# Patient Record
Sex: FEMALE | Race: BLACK OR AFRICAN AMERICAN | NOT HISPANIC OR LATINO | URBAN - METROPOLITAN AREA
[De-identification: names, ages, dates, MRNs, and addresses within clinical notes are randomized per-mention and may not be internally consistent; named-entity substitution may affect disease eponyms.]

---

## 2017-08-13 ENCOUNTER — EMERGENCY (EMERGENCY)
Facility: HOSPITAL | Age: 22
LOS: 1 days | Discharge: HOME | End: 2017-08-13
Attending: EMERGENCY MEDICINE | Admitting: EMERGENCY MEDICINE
Payer: MEDICAID

## 2017-08-13 VITALS
HEART RATE: 84 BPM | RESPIRATION RATE: 18 BRPM | DIASTOLIC BLOOD PRESSURE: 80 MMHG | OXYGEN SATURATION: 95 % | TEMPERATURE: 99 F | SYSTOLIC BLOOD PRESSURE: 114 MMHG

## 2017-08-13 VITALS
SYSTOLIC BLOOD PRESSURE: 107 MMHG | TEMPERATURE: 99 F | DIASTOLIC BLOOD PRESSURE: 75 MMHG | RESPIRATION RATE: 18 BRPM | HEART RATE: 81 BPM

## 2017-08-13 DIAGNOSIS — Z79.1 LONG TERM (CURRENT) USE OF NON-STEROIDAL ANTI-INFLAMMATORIES (NSAID): ICD-10-CM

## 2017-08-13 DIAGNOSIS — Z79.899 OTHER LONG TERM (CURRENT) DRUG THERAPY: ICD-10-CM

## 2017-08-13 DIAGNOSIS — R55 SYNCOPE AND COLLAPSE: ICD-10-CM

## 2017-08-13 DIAGNOSIS — R06.02 SHORTNESS OF BREATH: ICD-10-CM

## 2017-08-13 DIAGNOSIS — J20.9 ACUTE BRONCHITIS, UNSPECIFIED: ICD-10-CM

## 2017-08-13 LAB
ALBUMIN SERPL ELPH-MCNC: 4.1 G/DL — SIGNIFICANT CHANGE UP (ref 3.3–5)
ALP SERPL-CCNC: 88 U/L — SIGNIFICANT CHANGE UP (ref 40–120)
ALT FLD-CCNC: 11 U/L — SIGNIFICANT CHANGE UP (ref 10–45)
ANION GAP SERPL CALC-SCNC: 14 MMOL/L — SIGNIFICANT CHANGE UP (ref 5–17)
AST SERPL-CCNC: 15 U/L — SIGNIFICANT CHANGE UP (ref 10–40)
BASOPHILS NFR BLD AUTO: 0.2 % — SIGNIFICANT CHANGE UP (ref 0–2)
BILIRUB SERPL-MCNC: 0.4 MG/DL — SIGNIFICANT CHANGE UP (ref 0.2–1.2)
BUN SERPL-MCNC: 8 MG/DL — SIGNIFICANT CHANGE UP (ref 7–23)
CALCIUM SERPL-MCNC: 9.4 MG/DL — SIGNIFICANT CHANGE UP (ref 8.4–10.5)
CHLORIDE SERPL-SCNC: 104 MMOL/L — SIGNIFICANT CHANGE UP (ref 96–108)
CO2 SERPL-SCNC: 25 MMOL/L — SIGNIFICANT CHANGE UP (ref 22–31)
CREAT SERPL-MCNC: 0.8 MG/DL — SIGNIFICANT CHANGE UP (ref 0.5–1.3)
D DIMER BLD IA.RAPID-MCNC: 163 NG/ML DDU — SIGNIFICANT CHANGE UP
EOSINOPHIL NFR BLD AUTO: 2.8 % — SIGNIFICANT CHANGE UP (ref 0–6)
GLUCOSE SERPL-MCNC: 98 MG/DL — SIGNIFICANT CHANGE UP (ref 70–99)
HCT VFR BLD CALC: 40.2 % — SIGNIFICANT CHANGE UP (ref 34.5–45)
HGB BLD-MCNC: 13 G/DL — SIGNIFICANT CHANGE UP (ref 11.5–15.5)
LYMPHOCYTES # BLD AUTO: 39 % — SIGNIFICANT CHANGE UP (ref 13–44)
MCHC RBC-ENTMCNC: 27.7 PG — SIGNIFICANT CHANGE UP (ref 27–34)
MCHC RBC-ENTMCNC: 32.3 G/DL — SIGNIFICANT CHANGE UP (ref 32–36)
MCV RBC AUTO: 85.7 FL — SIGNIFICANT CHANGE UP (ref 80–100)
MONOCYTES NFR BLD AUTO: 6.1 % — SIGNIFICANT CHANGE UP (ref 2–14)
NEUTROPHILS NFR BLD AUTO: 51.9 % — SIGNIFICANT CHANGE UP (ref 43–77)
PLATELET # BLD AUTO: 283 K/UL — SIGNIFICANT CHANGE UP (ref 150–400)
POTASSIUM SERPL-MCNC: 4 MMOL/L — SIGNIFICANT CHANGE UP (ref 3.5–5.3)
POTASSIUM SERPL-SCNC: 4 MMOL/L — SIGNIFICANT CHANGE UP (ref 3.5–5.3)
PROT SERPL-MCNC: 7.6 G/DL — SIGNIFICANT CHANGE UP (ref 6–8.3)
RBC # BLD: 4.69 M/UL — SIGNIFICANT CHANGE UP (ref 3.8–5.2)
RBC # FLD: 12.8 % — SIGNIFICANT CHANGE UP (ref 10.3–16.9)
SODIUM SERPL-SCNC: 143 MMOL/L — SIGNIFICANT CHANGE UP (ref 135–145)
TROPONIN T SERPL-MCNC: <0.01 NG/ML — SIGNIFICANT CHANGE UP (ref 0–0.01)
WBC # BLD: 5.8 K/UL — SIGNIFICANT CHANGE UP (ref 3.8–10.5)
WBC # FLD AUTO: 5.8 K/UL — SIGNIFICANT CHANGE UP (ref 3.8–10.5)

## 2017-08-13 PROCEDURE — 80053 COMPREHEN METABOLIC PANEL: CPT

## 2017-08-13 PROCEDURE — 93010 ELECTROCARDIOGRAM REPORT: CPT

## 2017-08-13 PROCEDURE — 99285 EMERGENCY DEPT VISIT HI MDM: CPT | Mod: 25

## 2017-08-13 PROCEDURE — 71046 X-RAY EXAM CHEST 2 VIEWS: CPT

## 2017-08-13 PROCEDURE — 71010: CPT | Mod: 26

## 2017-08-13 PROCEDURE — 85025 COMPLETE CBC W/AUTO DIFF WBC: CPT

## 2017-08-13 PROCEDURE — 93005 ELECTROCARDIOGRAM TRACING: CPT

## 2017-08-13 PROCEDURE — 85379 FIBRIN DEGRADATION QUANT: CPT

## 2017-08-13 PROCEDURE — 84484 ASSAY OF TROPONIN QUANT: CPT

## 2017-08-13 PROCEDURE — 71020: CPT | Mod: 26

## 2017-08-13 PROCEDURE — 99283 EMERGENCY DEPT VISIT LOW MDM: CPT | Mod: 25

## 2017-08-13 RX ORDER — ALBUTEROL 90 UG/1
2 AEROSOL, METERED ORAL
Qty: 1 | Refills: 0
Start: 2017-08-13

## 2017-08-13 RX ORDER — IBUPROFEN 200 MG
1 TABLET ORAL
Qty: 12 | Refills: 0
Start: 2017-08-13

## 2017-08-13 NOTE — ED PROVIDER NOTE - OBJECTIVE STATEMENT
pt without signficant pmhx having nasal conges and nonprod cough x 2 wks.  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  she started having pain in sternal region when coughing or taking deep breath.  at one point this afternoon, she stood up quickly from chair and became lightheaded.  she sat down and still felt dizzy for a few moments before she apparently fainted, falling off side of chair.  coworker called [phone].  unknown duration of LOC.  she says she was not hurt when she fell.  she has never fainted before. there were no palpitations.  no known family hx of early heart disease or sudden death, but she doesn't know anything about her father's medical hx.  no hx of dvt/pe, estrogen use, recent immobilization, unilateral leg pain or swelling, hemoptysis, or malignancy.

## 2017-08-13 NOTE — ED PROVIDER NOTE - PHYSICAL EXAMINATION
CONSTITUTIONAL: WD,WN. NAD.    SKIN: Normal color and turgor. No rash.    HEAD: NC/AT.  EYES: Conjunctiva clear. EOMI. PERRL.    ENT: Airway patent, OP clear. Nasal mucosa clear, no rhinorrhea.   RESPIRATORY:  Breathing non-labored. No retractions or accessory muscle use.  Lungs CTA bilat.  CARDIOVASCULAR:  RRR, S1S2. No M/R/G.      GI:  Abdomen soft, nontender.    MSK: + tenderness in sternal region.  Neck supple with painless ROM.  No lower extremity edema or calf tenderness.  No joint swelling or ROM limitation.  NEURO: Alert and oriented; JAMIL with normal strength.  Normal speech and gait.

## 2017-08-13 NOTE — ED PROVIDER NOTE - ATTENDING CONTRIBUTION TO CARE
22 yo F p/w cough, dry, sob x 2 weeks.  No fever, chills.  Mild pleurisy.  No LE edema or calf ttp.  BL wheezing noted, nonlabored, comfortable.  VSS.  PERC neg.  Do not suspect PE.  Pt tx with nebs and improved.  Likely bronchitis vs RAD.  No indication for admission, no pna.  Plan outpt tx and fu.

## 2017-08-13 NOTE — ED PROVIDER NOTE - MEDICAL DECISION MAKING DETAILS
Pt with URI and cough for 2 weeks.  CP likely musculoskeletal in nature; PERC negative and low clinical susp for PE, but had syncopal episode today after a prodrome. Will check d-dimer.  Check EKG and CXR.

## 2017-08-13 NOTE — ED PROVIDER NOTE - NS ED ROS FT
CONSTITUTIONAL: No fever, chills, or weakness  NEURO: No headache, no dizziness; No weakness/tingling/numbness  EYES: No visual changes  ENT: No rhinorrhea or sore throat  PULM: per HPI  CV: No chest pain or palpitations  GI: No abdominal pain, vomiting, or diarrhea  : No dysuria, hematuria, frequency  MSK: No neck pain or back pain, no joint pain  SKIN: no rash

## 2018-02-15 ENCOUNTER — EMERGENCY (EMERGENCY)
Facility: HOSPITAL | Age: 23
LOS: 1 days | Discharge: HOME | End: 2018-02-15
Admitting: EMERGENCY MEDICINE
Payer: MEDICAID

## 2018-02-15 VITALS
SYSTOLIC BLOOD PRESSURE: 113 MMHG | OXYGEN SATURATION: 98 % | DIASTOLIC BLOOD PRESSURE: 72 MMHG | HEART RATE: 75 BPM | RESPIRATION RATE: 18 BRPM | TEMPERATURE: 98 F

## 2018-02-15 VITALS
HEART RATE: 80 BPM | DIASTOLIC BLOOD PRESSURE: 64 MMHG | OXYGEN SATURATION: 100 % | RESPIRATION RATE: 18 BRPM | SYSTOLIC BLOOD PRESSURE: 122 MMHG

## 2018-02-15 DIAGNOSIS — Z79.1 LONG TERM (CURRENT) USE OF NON-STEROIDAL ANTI-INFLAMMATORIES (NSAID): ICD-10-CM

## 2018-02-15 DIAGNOSIS — Z79.899 OTHER LONG TERM (CURRENT) DRUG THERAPY: ICD-10-CM
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
## 2018-02-15 DIAGNOSIS — R10.32 LEFT LOWER QUADRANT PAIN: ICD-10-CM

## 2018-02-15 DIAGNOSIS — R11.0 NAUSEA: ICD-10-CM
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
## 2018-02-15 DIAGNOSIS — S60.229A CONTUSION OF UNSPECIFIED HAND, INITIAL ENCOUNTER: ICD-10-CM

## 2018-02-15 LAB
ALBUMIN SERPL ELPH-MCNC: 3.8 G/DL — SIGNIFICANT CHANGE UP (ref 3.4–5)
ALP SERPL-CCNC: 77 U/L — SIGNIFICANT CHANGE UP (ref 40–120)
ALT FLD-CCNC: 20 U/L — SIGNIFICANT CHANGE UP (ref 12–42)
ANION GAP SERPL CALC-SCNC: 9 MMOL/L — SIGNIFICANT CHANGE UP (ref 9–16)
APPEARANCE UR: CLEAR — SIGNIFICANT CHANGE UP
AST SERPL-CCNC: 16 U/L — SIGNIFICANT CHANGE UP (ref 15–37)
BILIRUB SERPL-MCNC: 0.5 MG/DL — SIGNIFICANT CHANGE UP (ref 0.2–1.2)
BILIRUB UR-MCNC: (no result)
BUN SERPL-MCNC: 9 MG/DL — SIGNIFICANT CHANGE UP (ref 7–23)
CALCIUM SERPL-MCNC: 9.3 MG/DL — SIGNIFICANT CHANGE UP (ref 8.5–10.5)
CHLORIDE SERPL-SCNC: 105 MMOL/L — SIGNIFICANT CHANGE UP (ref 96–108)
CO2 SERPL-SCNC: 28 MMOL/L — SIGNIFICANT CHANGE UP (ref 22–31)
COLOR SPEC: YELLOW — SIGNIFICANT CHANGE UP
CREAT SERPL-MCNC: 0.78 MG/DL — SIGNIFICANT CHANGE UP (ref 0.5–1.3)
DIFF PNL FLD: NEGATIVE — SIGNIFICANT CHANGE UP
GLUCOSE SERPL-MCNC: 91 MG/DL — SIGNIFICANT CHANGE UP (ref 70–99)
GLUCOSE UR QL: NEGATIVE — SIGNIFICANT CHANGE UP
HCG SERPL-ACNC: <1 MIU/ML — SIGNIFICANT CHANGE UP
HCG UR QL: NEGATIVE — SIGNIFICANT CHANGE UP
HCT VFR BLD CALC: 37.8 % — SIGNIFICANT CHANGE UP (ref 34.5–45)
HGB BLD-MCNC: 12.1 G/DL — SIGNIFICANT CHANGE UP (ref 11.5–15.5)
KETONES UR-MCNC: (no result) MG/DL
LACTATE SERPL-SCNC: 0.7 MMOL/L — SIGNIFICANT CHANGE UP (ref 0.4–2)
LEUKOCYTE ESTERASE UR-ACNC: (no result)
LIDOCAIN IGE QN: 85 U/L — SIGNIFICANT CHANGE UP (ref 73–393)
MCHC RBC-ENTMCNC: 27.9 PG — SIGNIFICANT CHANGE UP (ref 27–34)
MCHC RBC-ENTMCNC: 32 G/DL — SIGNIFICANT CHANGE UP (ref 32–36)
MCV RBC AUTO: 87.1 FL — SIGNIFICANT CHANGE UP (ref 80–100)
NITRITE UR-MCNC: NEGATIVE — SIGNIFICANT CHANGE UP
PCP SPEC-MCNC: SIGNIFICANT CHANGE UP
PH UR: 7 — SIGNIFICANT CHANGE UP (ref 5–8)
PLATELET # BLD AUTO: 276 K/UL — SIGNIFICANT CHANGE UP (ref 150–400)
POTASSIUM SERPL-MCNC: 3.9 MMOL/L — SIGNIFICANT CHANGE UP (ref 3.5–5.3)
POTASSIUM SERPL-SCNC: 3.9 MMOL/L — SIGNIFICANT CHANGE UP (ref 3.5–5.3)
PROT SERPL-MCNC: 7.4 G/DL — SIGNIFICANT CHANGE UP (ref 6.4–8.2)
PROT UR-MCNC: NEGATIVE MG/DL — SIGNIFICANT CHANGE UP
RBC # BLD: 4.34 M/UL — SIGNIFICANT CHANGE UP (ref 3.8–5.2)
RBC # FLD: 12.1 % — SIGNIFICANT CHANGE UP (ref 10.3–16.9)
SODIUM SERPL-SCNC: 142 MMOL/L — SIGNIFICANT CHANGE UP (ref 132–145)
SP GR SPEC: 1.02 — SIGNIFICANT CHANGE UP (ref 1–1.03)
UROBILINOGEN FLD QL: 1 E.U./DL — SIGNIFICANT CHANGE UP
WBC # BLD: 4.7 K/UL — SIGNIFICANT CHANGE UP (ref 3.8–10.5)
WBC # FLD AUTO: 4.7 K/UL — SIGNIFICANT CHANGE UP (ref 3.8–10.5)

## 2018-02-15 PROCEDURE — 76830 TRANSVAGINAL US NON-OB: CPT | Mod: 26

## 2018-02-15 PROCEDURE — 99285 EMERGENCY DEPT VISIT HI MDM: CPT

## 2018-02-15 PROCEDURE — 73130 X-RAY EXAM OF HAND: CPT | Mod: 26,RT

## 2018-02-15 PROCEDURE — 74019 RADEX ABDOMEN 2 VIEWS: CPT | Mod: 26

## 2018-02-15 RX ORDER — ACETAMINOPHEN 500 MG
650 TABLET ORAL ONCE
Refills: 0 | Status: COMPLETED | OUTPATIENT
Start: 2018-02-15 | End: 2018-02-15

## 2018-02-15 RX ORDER — SODIUM CHLORIDE 9 MG/ML
3 INJECTION INTRAMUSCULAR; INTRAVENOUS; SUBCUTANEOUS ONCE
Refills: 0 | Status: COMPLETED | OUTPATIENT
Start: 2018-02-15 | End: 2018-02-15

## 2018-02-15 RX ORDER — PHENAZOPYRIDINE HCL 100 MG
200 TABLET ORAL ONCE
Refills: 0 | Status: COMPLETED | OUTPATIENT
Start: 2018-02-15 | End: 2018-02-15

## 2018-02-15 RX ORDER — AZITHROMYCIN 500 MG/1
1000 TABLET, FILM COATED ORAL ONCE
Refills: 0 | Status: COMPLETED | OUTPATIENT
Start: 2018-02-15 | End: 2018-02-15

## 2018-02-15 RX ORDER — IBUPROFEN 200 MG
600 TABLET ORAL ONCE
Refills: 0 | Status: COMPLETED | OUTPATIENT
Start: 2018-02-15 | End: 2018-02-15

## 2018-02-15 RX ORDER — CEFTRIAXONE 500 MG/1
250 INJECTION, POWDER, FOR SOLUTION INTRAMUSCULAR; INTRAVENOUS ONCE
Refills: 0 | Status: COMPLETED | OUTPATIENT
Start: 2018-02-15 | End: 2018-02-15

## 2018-02-15 RX ADMIN — Medication 600 MILLIGRAM(S): at 23:14

## 2018-02-15 RX ADMIN — SODIUM CHLORIDE 3 MILLILITER(S): 9 INJECTION INTRAMUSCULAR; INTRAVENOUS; SUBCUTANEOUS at 19:48

## 2018-02-15 RX ADMIN — Medication 650 MILLIGRAM(S): at 21:22

## 2018-02-15 RX ADMIN — AZITHROMYCIN 1000 MILLIGRAM(S): 500 TABLET, FILM COATED ORAL at 23:13

## 2018-02-15 RX ADMIN — CEFTRIAXONE 250 MILLIGRAM(S): 500 INJECTION, POWDER, FOR SOLUTION INTRAMUSCULAR; INTRAVENOUS at 23:13

## 2018-02-15 RX ADMIN — Medication 200 MILLIGRAM(S): at 21:22

## 2018-02-15 NOTE — ED PROVIDER NOTE - OBJECTIVE STATEMENT
21 y/o F presents to ED c/o lower abd pain today with nausea.  She states she has had similar pain intermittently for the past 4 months.  She was evaluated by her gyn and told her IUD (placed 2014) was poorly positioned and would need to have it removed.  She states she was supposed to attempt an in office removal but did not follow up due schedule conflicts.  She has had intermittent pain since.  She denies fevers/chills, dysuria, hematuria, back pain, trauma/falls, vaginal discharge.  Pt cannot recall her last BM but states it has been longer than 2 days.

## 2018-02-15 NOTE — ED PROVIDER NOTE - MEDICAL DECISION MAKING DETAILS
23 y/o F presents to ED c/o lower abd pain.  Pt well appearing. VSS.  NAD. 21 y/o F presents to ED c/o lower abd pain.  Pt well appearing. VSS.  NAD.  Abd reexamined.  Pt does have LLQ pain but states it is unchanged. 23 y/o F presents to ED c/o lower abd pain.  Pt well appearing. VSS.  NAD.  Abd reexamined.  Pt does have LLQ pain but states it is unchanged from past episodes.  Will avoid CT as pain is likely chronic.  IUD well positioned in TvUS.  Pt advised to f/u with gyn and GI.  G/C testing added to UA, lab called for add on.  Pt agrees to empiric treatment.  Pt aware to abstain from sexual activity for 7 days.  Strict return precautions discussed.  Pt verbalized understanding.

## 2018-02-15 NOTE — ED ADULT NURSE NOTE - NSHISCREENINGQ1_ED_A_ED
NOTIFICATION RETURN TO WORK / SCHOOL 
 
9/14/2017 1:22 PM 
 
Ms. Brayan Lazo [phone] [facility] [id number] To Whom It May Concern: 
 
Early Inches is currently under the care of [facility]. She will remain out of work until her next office visit on 10/06/17, in which she will be re-evaluated If there are questions or concerns please have the patient contact our office. Sincerely, Moises Burrows NP 
 
                                
 

No

## 2018-02-17 LAB
C TRACH RRNA SPEC QL NAA+PROBE: DETECTED
CULTURE RESULTS: SIGNIFICANT CHANGE UP
N GONORRHOEA RRNA SPEC QL NAA+PROBE: SIGNIFICANT CHANGE UP
SPECIMEN SOURCE: SIGNIFICANT CHANGE UP
SPECIMEN SOURCE: SIGNIFICANT CHANGE UP

## 2018-02-19 NOTE — ED POST DISCHARGE NOTE - RESULT SUMMARY
Chlamydia positive, treated appropriately in ED; will require call to notify re: partner testing/treatment

## 2018-03-28 ENCOUNTER — EMERGENCY (EMERGENCY)
Facility: HOSPITAL | Age: 23
LOS: 1 days | Discharge: HOME | End: 2018-03-28
Admitting: EMERGENCY MEDICINE
Payer: MEDICAID

## 2018-03-28 VITALS
OXYGEN SATURATION: 100 % | HEIGHT: 65 IN | HEART RATE: 89 BPM | WEIGHT: 279.99 LBS | RESPIRATION RATE: 18 BRPM | SYSTOLIC BLOOD PRESSURE: 129 MMHG | DIASTOLIC BLOOD PRESSURE: 81 MMHG | TEMPERATURE: 98 F

## 2018-03-28 DIAGNOSIS — N64.4 MASTODYNIA: ICD-10-CM

## 2018-03-28 DIAGNOSIS — M25.571 PAIN IN RIGHT ANKLE AND JOINTS OF RIGHT FOOT: ICD-10-CM

## 2018-03-28 DIAGNOSIS — R07.89 OTHER CHEST PAIN: ICD-10-CM

## 2018-03-28 DIAGNOSIS — Z79.1 LONG TERM (CURRENT) USE OF NON-STEROIDAL ANTI-INFLAMMATORIES (NSAID): ICD-10-CM

## 2018-03-28 DIAGNOSIS — Z79.899 OTHER LONG TERM (CURRENT) DRUG THERAPY: ICD-10-CM

## 2018-03-28 PROCEDURE — 99284 EMERGENCY DEPT VISIT MOD MDM: CPT | Mod: 25

## 2018-03-28 PROCEDURE — 93010 ELECTROCARDIOGRAM REPORT: CPT

## 2018-03-28 PROCEDURE — 71045 X-RAY EXAM CHEST 1 VIEW: CPT | Mod: 26

## 2018-03-28 NOTE — ED ADULT TRIAGE NOTE - CHIEF COMPLAINT QUOTE
ambulated to ed c/o generalized right breast pain as well as right ankle pain, since 2pm today, ambulatory with steady gait. No resp distress, no sob no n/v.

## 2018-03-29 PROCEDURE — 71045 X-RAY EXAM CHEST 1 VIEW: CPT | Mod: 26

## 2018-03-29 RX ORDER — ACETAMINOPHEN 500 MG
650 TABLET ORAL ONCE
Refills: 0 | Status: COMPLETED | OUTPATIENT
Start: 2018-03-29 | End: 2018-03-29

## 2018-03-29 RX ADMIN — Medication 650 MILLIGRAM(S): at 00:41

## 2018-03-29 NOTE — ED PROVIDER NOTE - OBJECTIVE STATEMENT
21 y/o F presents to ED c/o intermittent, mild, "tight, achy" localized pain to upper right side of chest wall since 14:00pm. 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 Pain is exacerbated with movements of RUE and improves when remaining still. She has not taken any medication for the pain. She denies having any other associated sx's but states she would like to have her right ankle evaluated because she has on and off right ankle pain x 1 year and was told by her PMD it was 2/2 her being overweight (per pt).    Denies fever, chills, dizziness, headache, syncope, SOB, palpitationc, cough, hemoptysis, abdo pain, N/V/D, extremity paresthesias, numbness, weakness, calf pain, or swelling, recent injury or trauma

## 2018-03-29 NOTE — ED PROVIDER NOTE - MEDICAL DECISION MAKING DETAILS
Chest wall pain is reproducible on palpation. EKG Sinus bradycardia, nonischemic. No acute cardiopulmonary pathology on CXR. No remarkable findings on examination of ankle. Pt given tylenol in ED. A&Ox3. NAD. Sitting comfortably with no other complaints at this time. Will D/C with instructions to F/U with PMD and Cardio this week. Strict return precautions reviewed with pt in which pt verbalizes understanding and agrees to.

## 2018-03-29 NOTE — ED PROVIDER NOTE - EXTREMITY EXAM
No tenderness or swelling to right ankle at this time. FROM. NV status intact./no deformity, pain or tenderness, no restriction of movement

## 2018-03-29 NOTE — ED PROVIDER NOTE - MUSCULOSKELETAL MINIMAL EXAM
Chest wall pain is reproducible on palpation of right upper chest wall./normal range of motion/motor intact/TENDERNESS

## 2018-05-29 ENCOUNTER — EMERGENCY (EMERGENCY)
Facility: HOSPITAL | Age: 23
LOS: 1 days | Discharge: HOME | End: 2018-05-29
Admitting: EMERGENCY MEDICINE
Payer: MEDICAID

## 2018-05-29 VITALS
HEART RATE: 103 BPM | TEMPERATURE: 100 F | DIASTOLIC BLOOD PRESSURE: 86 MMHG | RESPIRATION RATE: 18 BRPM | SYSTOLIC BLOOD PRESSURE: 134 MMHG | WEIGHT: 293 LBS

## 2018-05-29 VITALS
TEMPERATURE: 99 F | RESPIRATION RATE: 17 BRPM | SYSTOLIC BLOOD PRESSURE: 134 MMHG | OXYGEN SATURATION: 100 % | HEART RATE: 92 BPM | DIASTOLIC BLOOD PRESSURE: 68 MMHG

## 2018-05-29 DIAGNOSIS — N64.52 NIPPLE DISCHARGE: ICD-10-CM

## 2018-05-29 DIAGNOSIS — R11.2 NAUSEA WITH VOMITING, UNSPECIFIED: ICD-10-CM

## 2018-05-29 DIAGNOSIS — Z79.899 OTHER LONG TERM (CURRENT) DRUG THERAPY: ICD-10-CM

## 2018-05-29 LAB
ALBUMIN SERPL ELPH-MCNC: 3.8 G/DL — SIGNIFICANT CHANGE UP (ref 3.4–5)
ALP SERPL-CCNC: 78 U/L — SIGNIFICANT CHANGE UP (ref 40–120)
ALT FLD-CCNC: 16 U/L — SIGNIFICANT CHANGE UP (ref 12–42)
ANION GAP SERPL CALC-SCNC: 9 MMOL/L — SIGNIFICANT CHANGE UP (ref 9–16)
APPEARANCE UR: CLEAR — SIGNIFICANT CHANGE UP
AST SERPL-CCNC: 13 U/L — LOW (ref 15–37)
BILIRUB SERPL-MCNC: 0.4 MG/DL — SIGNIFICANT CHANGE UP (ref 0.2–1.2)
BILIRUB UR-MCNC: (no result)
BUN SERPL-MCNC: 12 MG/DL — SIGNIFICANT CHANGE UP (ref 7–23)
CALCIUM SERPL-MCNC: 9.2 MG/DL — SIGNIFICANT CHANGE UP (ref 8.5–10.5)
CHLORIDE SERPL-SCNC: 106 MMOL/L — SIGNIFICANT CHANGE UP (ref 96–108)
CO2 SERPL-SCNC: 26 MMOL/L — SIGNIFICANT CHANGE UP (ref 22–31)
COLOR SPEC: YELLOW — SIGNIFICANT CHANGE UP
CREAT SERPL-MCNC: 0.93 MG/DL — SIGNIFICANT CHANGE UP (ref 0.5–1.3)
DIFF PNL FLD: NEGATIVE — SIGNIFICANT CHANGE UP
GLUCOSE SERPL-MCNC: 112 MG/DL — HIGH (ref 70–99)
GLUCOSE UR QL: NEGATIVE — SIGNIFICANT CHANGE UP
HCG SERPL-ACNC: <1 MIU/ML — SIGNIFICANT CHANGE UP
HCG UR QL: NEGATIVE — SIGNIFICANT CHANGE UP
HCT VFR BLD CALC: 40.3 % — SIGNIFICANT CHANGE UP (ref 34.5–45)
HGB BLD-MCNC: 12.9 G/DL — SIGNIFICANT CHANGE UP (ref 11.5–15.5)
KETONES UR-MCNC: 15 MG/DL
LEUKOCYTE ESTERASE UR-ACNC: (no result)
LIDOCAIN IGE QN: 118 U/L — SIGNIFICANT CHANGE UP (ref 73–393)
MAGNESIUM SERPL-MCNC: 1.9 MG/DL — SIGNIFICANT CHANGE UP (ref 1.6–2.6)
MCHC RBC-ENTMCNC: 27.9 PG — SIGNIFICANT CHANGE UP (ref 27–34)
MCHC RBC-ENTMCNC: 32 G/DL — SIGNIFICANT CHANGE UP (ref 32–36)
MCV RBC AUTO: 87 FL — SIGNIFICANT CHANGE UP (ref 80–100)
NITRITE UR-MCNC: NEGATIVE — SIGNIFICANT CHANGE UP
PH UR: 6 — SIGNIFICANT CHANGE UP (ref 5–8)
PLATELET # BLD AUTO: 279 K/UL — SIGNIFICANT CHANGE UP (ref 150–400)
POTASSIUM SERPL-MCNC: 3.3 MMOL/L — LOW (ref 3.5–5.3)
POTASSIUM SERPL-SCNC: 3.3 MMOL/L — LOW (ref 3.5–5.3)
PROT SERPL-MCNC: 8 G/DL — SIGNIFICANT CHANGE UP (ref 6.4–8.2)
PROT UR-MCNC: 30 MG/DL
RBC # BLD: 4.63 M/UL — SIGNIFICANT CHANGE UP (ref 3.8–5.2)
RBC # FLD: 12.1 % — SIGNIFICANT CHANGE UP (ref 10.3–16.9)
SODIUM SERPL-SCNC: 141 MMOL/L — SIGNIFICANT CHANGE UP (ref 132–145)
SP GR SPEC: >=1.03 — SIGNIFICANT CHANGE UP (ref 1–1.03)
UROBILINOGEN FLD QL: 1 E.U./DL — SIGNIFICANT CHANGE UP
WBC # BLD: 5.3 K/UL — SIGNIFICANT CHANGE UP (ref 3.8–10.5)
WBC # FLD AUTO: 5.3 K/UL — SIGNIFICANT CHANGE UP (ref 3.8–10.5)

## 2018-05-29 PROCEDURE — 99284 EMERGENCY DEPT VISIT MOD MDM: CPT

## 2018-05-29 RX ORDER — ACETAMINOPHEN 500 MG
650 TABLET ORAL ONCE
Refills: 0 | Status: COMPLETED | OUTPATIENT
Start: 2018-05-29 | End: 2018-05-29

## 2018-05-29 RX ORDER — POTASSIUM CHLORIDE 20 MEQ
40 PACKET (EA) ORAL ONCE
Refills: 0 | Status: COMPLETED | OUTPATIENT
Start: 2018-05-29 | End: 2018-05-29

## 2018-05-29 RX ADMIN — Medication 40 MILLIEQUIVALENT(S): at 17:21

## 2018-05-29 RX ADMIN — Medication 650 MILLIGRAM(S): at 16:25

## 2018-05-29 NOTE — ED ADULT TRIAGE NOTE - CHIEF COMPLAINT QUOTE
c/o N/ x 2 days and clear discharge from bilateral breasts for 2 weeks. denies abdominal pain, sick contacts recent travel. states she thinks she could be pregnant, has IUD placed.

## 2018-05-29 NOTE — ED ADULT NURSE NOTE - OBJECTIVE STATEMENT
PAtient to ED with complaint of nausea X 2 days.  Denies abdominal pain.  Patient states that she might be pregnant

## 2018-05-29 NOTE — ED PROVIDER NOTE - OBJECTIVE STATEMENT
23 y/o F presents to ED c/o vomiting and discharge from breasts for a few days. Pt states she took a pregnancy test earlier this week which resulted to be negative. Notes nausea, vomiting, decreased appetite and clear discharge from breasts. Denies any abdominal pain, fevers or chills. +IUD (2013). Reports similar symptoms with previous pregnancy. 21 y/o F presents to ED c/o vomiting x 2 days with clear discharge from breasts for a few days. Pt states she took a pregnancy test at home earlier this week which resulted to be negative. Notes nausea, vomiting, decreased appetite and clear discharge from breasts. Denies any abdominal pain, fevers or chills. +IUD.. No abdominal pain or diarrhea

## 2018-05-29 NOTE — ED PROVIDER NOTE - MEDICAL DECISION MAKING DETAILS
clear breast discharge bilaterally, with vomiting yesterday, not pregnant, potassium 3.3 supplemented, patient advised to f/u breast center

## 2018-06-22 ENCOUNTER — EMERGENCY (EMERGENCY)
Facility: HOSPITAL | Age: 23
LOS: 1 days | Discharge: HOME | End: 2018-06-22
Attending: EMERGENCY MEDICINE | Admitting: EMERGENCY MEDICINE
Payer: MEDICAID

## 2018-06-22 VITALS
RESPIRATION RATE: 18 BRPM | TEMPERATURE: 101 F | HEART RATE: 91 BPM | SYSTOLIC BLOOD PRESSURE: 128 MMHG | DIASTOLIC BLOOD PRESSURE: 79 MMHG | OXYGEN SATURATION: 100 %

## 2018-06-22 VITALS
OXYGEN SATURATION: 99 % | DIASTOLIC BLOOD PRESSURE: 75 MMHG | HEART RATE: 82 BPM | TEMPERATURE: 99 F | SYSTOLIC BLOOD PRESSURE: 123 MMHG | RESPIRATION RATE: 17 BRPM

## 2018-06-22 DIAGNOSIS — Z79.1 LONG TERM (CURRENT) USE OF NON-STEROIDAL ANTI-INFLAMMATORIES (NSAID): ICD-10-CM

## 2018-06-22 DIAGNOSIS — J02.9 ACUTE PHARYNGITIS, UNSPECIFIED: ICD-10-CM

## 2018-06-22 LAB — S PYO AG SPEC QL IA: NEGATIVE — SIGNIFICANT CHANGE UP

## 2018-06-22 PROCEDURE — 99283 EMERGENCY DEPT VISIT LOW MDM: CPT

## 2018-06-22 RX ORDER — KETOROLAC TROMETHAMINE 30 MG/ML
30 SYRINGE (ML) INJECTION ONCE
Refills: 0 | Status: DISCONTINUED | OUTPATIENT
Start: 2018-06-22 | End: 2018-06-22

## 2018-06-22 RX ORDER — ACETAMINOPHEN 500 MG
975 TABLET ORAL ONCE
Refills: 0 | Status: COMPLETED | OUTPATIENT
Start: 2018-06-22 | End: 2018-06-22

## 2018-06-22 RX ORDER — BENZOCAINE AND MENTHOL 5; 1 G/100ML; G/100ML
1 LIQUID ORAL
Qty: 84 | Refills: 0
Start: 2018-06-22 | End: 2018-06-28

## 2018-06-22 RX ORDER — DEXAMETHASONE 0.5 MG/5ML
10 ELIXIR ORAL ONCE
Refills: 0 | Status: COMPLETED | OUTPATIENT
Start: 2018-06-22 | End: 2018-06-22

## 2018-06-22 RX ADMIN — Medication 30 MILLIGRAM(S): at 18:34

## 2018-06-22 RX ADMIN — Medication 10 MILLIGRAM(S): at 18:34

## 2018-06-22 RX ADMIN — Medication 975 MILLIGRAM(S): at 18:34

## 2018-06-22 RX ADMIN — Medication 1 TABLET(S): at 18:34

## 2018-06-22 NOTE — ED PROVIDER NOTE - PROGRESS NOTE DETAILS
symptoms improved.  resting comfortably in the bed.  Strep negative but will cover with Augmentin.  lymphadenopathy likely reactive.  Conservative management discussed with the patient in detail.  Close PMD follow up encouraged.  Strict ED return instructions discussed in detail and patient given the opportunity to ask any questions about their discharge diagnosis and instructions

## 2018-06-22 NOTE — ED PROVIDER NOTE - OBJECTIVE STATEMENT
22 y o female with PMHX of obesity presents to the ED for sore throat x2 days. Pt woke up 2 days ago with the sore throat, expressing additional sx of dysphagia, difficulty speaking, rhinorrhea, and fever. Pt states that she has made no contact with any sick people. Has attempted salt water gargle with little to no improvements. Denies Hx of strep throat. Denies cough, chest pain, SOB, chills, n/v/d, diaphoresis, headache, or any other sx.

## 2018-06-22 NOTE — ED PROVIDER NOTE - CHPI ED SYMPTOMS NEG
no diarrhea, no cough, no chest pain, no SOB, no chills, no diaphoresis, no headache/no nausea/no vomiting

## 2018-06-22 NOTE — ED PROVIDER NOTE - ENMT, MLM
Airway patent, Nasal mucosa clear. Mouth with normal mucosa. Throat shows tonsilar erythema with exudates. Tenderness to the throat upon palpation, especially on the left side. Airway patent, Nasal mucosa clear. Mouth with normal mucosa. Throat shows tonsilar erythema with exudates and erythema.  Midline uvula.  NO trismus or halitosis.

## 2018-06-24 LAB
CULTURE RESULTS: SIGNIFICANT CHANGE UP
SPECIMEN SOURCE: SIGNIFICANT CHANGE UP

## 2018-08-10 ENCOUNTER — EMERGENCY (EMERGENCY)
Facility: HOSPITAL | Age: 23
LOS: 1 days | Discharge: HOME | End: 2018-08-10
Attending: EMERGENCY MEDICINE | Admitting: EMERGENCY MEDICINE
Payer: MEDICAID

## 2018-08-10 VITALS
HEART RATE: 80 BPM | RESPIRATION RATE: 18 BRPM | OXYGEN SATURATION: 98 % | SYSTOLIC BLOOD PRESSURE: 126 MMHG | TEMPERATURE: 98 F | DIASTOLIC BLOOD PRESSURE: 86 MMHG

## 2018-08-10 LAB
ALBUMIN SERPL ELPH-MCNC: 3.4 G/DL — SIGNIFICANT CHANGE UP (ref 3.4–5)
ALP SERPL-CCNC: 82 U/L — SIGNIFICANT CHANGE UP (ref 40–120)
ALT FLD-CCNC: 15 U/L — SIGNIFICANT CHANGE UP (ref 12–42)
AMYLASE P1 CFR SERPL: 30 U/L — SIGNIFICANT CHANGE UP (ref 25–115)
ANION GAP SERPL CALC-SCNC: 8 MMOL/L — LOW (ref 9–16)
APTT BLD: 32 SEC — SIGNIFICANT CHANGE UP (ref 27.5–36.5)
AST SERPL-CCNC: 13 U/L — LOW (ref 15–37)
BILIRUB SERPL-MCNC: 0.5 MG/DL — SIGNIFICANT CHANGE UP (ref 0.2–1.2)
BUN SERPL-MCNC: 5 MG/DL — LOW (ref 7–23)
CALCIUM SERPL-MCNC: 9 MG/DL — SIGNIFICANT CHANGE UP (ref 8.5–10.5)
CHLORIDE SERPL-SCNC: 107 MMOL/L — SIGNIFICANT CHANGE UP (ref 96–108)
CO2 SERPL-SCNC: 27 MMOL/L — SIGNIFICANT CHANGE UP (ref 22–31)
CREAT SERPL-MCNC: 0.78 MG/DL — SIGNIFICANT CHANGE UP (ref 0.5–1.3)
GLUCOSE SERPL-MCNC: 103 MG/DL — HIGH (ref 70–99)
HCG SERPL-ACNC: <1 MIU/ML — SIGNIFICANT CHANGE UP
HCT VFR BLD CALC: 37.2 % — SIGNIFICANT CHANGE UP (ref 34.5–45)
HGB BLD-MCNC: 12.1 G/DL — SIGNIFICANT CHANGE UP (ref 11.5–15.5)
INR BLD: 1.21 — HIGH (ref 0.88–1.16)
LIDOCAIN IGE QN: 115 U/L — SIGNIFICANT CHANGE UP (ref 73–393)
MCHC RBC-ENTMCNC: 27.8 PG — SIGNIFICANT CHANGE UP (ref 27–34)
MCHC RBC-ENTMCNC: 32.5 G/DL — SIGNIFICANT CHANGE UP (ref 32–36)
MCV RBC AUTO: 85.5 FL — SIGNIFICANT CHANGE UP (ref 80–100)
PLATELET # BLD AUTO: 251 K/UL — SIGNIFICANT CHANGE UP (ref 150–400)
POTASSIUM SERPL-MCNC: 3.6 MMOL/L — SIGNIFICANT CHANGE UP (ref 3.5–5.3)
POTASSIUM SERPL-SCNC: 3.6 MMOL/L — SIGNIFICANT CHANGE UP (ref 3.5–5.3)
PROT SERPL-MCNC: 7.4 G/DL — SIGNIFICANT CHANGE UP (ref 6.4–8.2)
PROTHROM AB SERPL-ACNC: 13.4 SEC — HIGH (ref 9.8–12.7)
RBC # BLD: 4.35 M/UL — SIGNIFICANT CHANGE UP (ref 3.8–5.2)
RBC # FLD: 12.7 % — SIGNIFICANT CHANGE UP (ref 10.3–16.9)
SODIUM SERPL-SCNC: 142 MMOL/L — SIGNIFICANT CHANGE UP (ref 132–145)
WBC # BLD: 4 K/UL — SIGNIFICANT CHANGE UP (ref 3.8–10.5)
WBC # FLD AUTO: 4 K/UL — SIGNIFICANT CHANGE UP (ref 3.8–10.5)

## 2018-08-10 PROCEDURE — 76830 TRANSVAGINAL US NON-OB: CPT | Mod: 26

## 2018-08-10 PROCEDURE — 76700 US EXAM ABDOM COMPLETE: CPT | Mod: 26

## 2018-08-10 PROCEDURE — 99284 EMERGENCY DEPT VISIT MOD MDM: CPT

## 2018-08-10 RX ORDER — SODIUM CHLORIDE 9 MG/ML
1000 INJECTION INTRAMUSCULAR; INTRAVENOUS; SUBCUTANEOUS ONCE
Refills: 0 | Status: COMPLETED | OUTPATIENT
Start: 2018-08-10 | End: 2018-08-10

## 2018-08-10 RX ORDER — FAMOTIDINE 10 MG/ML
1 INJECTION INTRAVENOUS
Qty: 28 | Refills: 0
Start: 2018-08-10 | End: 2018-08-23

## 2018-08-10 RX ADMIN — SODIUM CHLORIDE 500 MILLILITER(S): 9 INJECTION INTRAMUSCULAR; INTRAVENOUS; SUBCUTANEOUS at 14:05

## 2018-08-10 NOTE — ED ADULT TRIAGE NOTE - CHIEF COMPLAINT QUOTE
abdominal pain for 3 days with one episode of vomiting thinks she might be pregnant. does not know her LMP/states spotting in july

## 2018-08-10 NOTE — ED PROVIDER NOTE - OBJECTIVE STATEMENT
23 y/o female with PMHx of recent IUD (now removed) presents to ED c/o suprapubic abd pain x 1 week. Patient reports that pain has been worsening during this time with associated vomiting 2 days ago, nausea, and decreased appetite after vomiting. States that she recently removed her IUD last month and is unsure of LMP.

## 2018-08-10 NOTE — ED ADULT NURSE NOTE - CHPI ED NUR SYMPTOMS NEG
no abdominal distension/no blood in stool/no burning urination/no chills/no diarrhea/no dysuria/no fever

## 2018-08-10 NOTE — ED ADULT NURSE NOTE - OBJECTIVE STATEMENT
Pt c/o generalized abdominal pain x1 week, with decreased PO intake, reports N/V when eating. Pt denies any CP/SOB, no fever/chills, no diarrhea, no recent travel or recent sick contacts. Pt denies any painful urination, no hematuria. Reports pain is sharp and intermittent.

## 2018-08-10 NOTE — ED PROVIDER NOTE - MEDICAL DECISION MAKING DETAILS
lower abd pain x weeks, sonogram of pelvis and abdomen unremarkable today, labs reviewed, will Rx Pepcid and follow up with primary care

## 2018-08-10 NOTE — ED ADULT NURSE NOTE - NSIMPLEMENTINTERV_GEN_ALL_ED
Implemented All Universal Safety Interventions:  [place] to call system. Call bell, personal items and telephone within reach. Instruct patient to call for assistance. Room bathroom lighting operational. Non-slip footwear when patient is off stretcher. Physically safe environment: no spills, clutter or unnecessary equipment. Stretcher in lowest position, wheels locked, appropriate side rails in place.

## 2018-08-14 DIAGNOSIS — Z79.1 LONG TERM (CURRENT) USE OF NON-STEROIDAL ANTI-INFLAMMATORIES (NSAID): ICD-10-CM

## 2018-08-14 DIAGNOSIS — R10.30 LOWER ABDOMINAL PAIN, UNSPECIFIED: ICD-10-CM

## 2018-08-14 DIAGNOSIS — Z79.2 LONG TERM (CURRENT) USE OF ANTIBIOTICS: ICD-10-CM

## 2018-08-14 DIAGNOSIS — R63.0 ANOREXIA: ICD-10-CM

## 2018-08-14 DIAGNOSIS — Z79.899 OTHER LONG TERM (CURRENT) DRUG THERAPY: ICD-10-CM

## 2018-08-14 DIAGNOSIS — R11.2 NAUSEA WITH VOMITING, UNSPECIFIED: ICD-10-CM

## 2018-09-07 ENCOUNTER — EMERGENCY (EMERGENCY)
Facility: HOSPITAL | Age: 23
LOS: 1 days | Discharge: HOME | End: 2018-09-07
Attending: EMERGENCY MEDICINE | Admitting: EMERGENCY MEDICINE
Payer: MEDICAID

## 2018-09-07 VITALS
WEIGHT: 250 LBS | RESPIRATION RATE: 17 BRPM | SYSTOLIC BLOOD PRESSURE: 131 MMHG | HEART RATE: 66 BPM | TEMPERATURE: 99 F | OXYGEN SATURATION: 100 % | DIASTOLIC BLOOD PRESSURE: 74 MMHG

## 2018-09-07 VITALS
RESPIRATION RATE: 18 BRPM | SYSTOLIC BLOOD PRESSURE: 108 MMHG | HEART RATE: 73 BPM | TEMPERATURE: 99 F | OXYGEN SATURATION: 100 % | DIASTOLIC BLOOD PRESSURE: 74 MMHG

## 2018-09-07 DIAGNOSIS — Z79.2 LONG TERM (CURRENT) USE OF ANTIBIOTICS: ICD-10-CM

## 2018-09-07 DIAGNOSIS — K59.00 CONSTIPATION, UNSPECIFIED: ICD-10-CM

## 2018-09-07 DIAGNOSIS — Z79.1 LONG TERM (CURRENT) USE OF NON-STEROIDAL ANTI-INFLAMMATORIES (NSAID): ICD-10-CM

## 2018-09-07 DIAGNOSIS — O26.891 OTHER SPECIFIED PREGNANCY RELATED CONDITIONS, FIRST TRIMESTER: ICD-10-CM

## 2018-09-07 DIAGNOSIS — R11.0 NAUSEA: ICD-10-CM

## 2018-09-07 DIAGNOSIS — R82.71 BACTERIURIA: ICD-10-CM

## 2018-09-07 DIAGNOSIS — Z79.899 OTHER LONG TERM (CURRENT) DRUG THERAPY: ICD-10-CM

## 2018-09-07 LAB
ALBUMIN SERPL ELPH-MCNC: 3.5 G/DL — SIGNIFICANT CHANGE UP (ref 3.4–5)
ALP SERPL-CCNC: 64 U/L — SIGNIFICANT CHANGE UP (ref 40–120)
ALT FLD-CCNC: 13 U/L — SIGNIFICANT CHANGE UP (ref 12–42)
ANION GAP SERPL CALC-SCNC: 8 MMOL/L — LOW (ref 9–16)
APPEARANCE UR: CLEAR — SIGNIFICANT CHANGE UP
AST SERPL-CCNC: 16 U/L — SIGNIFICANT CHANGE UP (ref 15–37)
BACTERIA # UR AUTO: ABNORMAL /HPF
BASOPHILS NFR BLD AUTO: 0.2 % — SIGNIFICANT CHANGE UP (ref 0–2)
BILIRUB SERPL-MCNC: 0.3 MG/DL — SIGNIFICANT CHANGE UP (ref 0.2–1.2)
BILIRUB UR-MCNC: ABNORMAL
BUN SERPL-MCNC: 4 MG/DL — LOW (ref 7–23)
CALCIUM SERPL-MCNC: 8.8 MG/DL — SIGNIFICANT CHANGE UP (ref 8.5–10.5)
CHLORIDE SERPL-SCNC: 106 MMOL/L — SIGNIFICANT CHANGE UP (ref 96–108)
CO2 SERPL-SCNC: 26 MMOL/L — SIGNIFICANT CHANGE UP (ref 22–31)
COLOR SPEC: YELLOW — SIGNIFICANT CHANGE UP
COMMENT - URINE: SIGNIFICANT CHANGE UP
CREAT SERPL-MCNC: 0.82 MG/DL — SIGNIFICANT CHANGE UP (ref 0.5–1.3)
DIFF PNL FLD: ABNORMAL
EOSINOPHIL NFR BLD AUTO: 1.2 % — SIGNIFICANT CHANGE UP (ref 0–6)
EPI CELLS # UR: ABNORMAL /HPF
GLUCOSE SERPL-MCNC: 85 MG/DL — SIGNIFICANT CHANGE UP (ref 70–99)
GLUCOSE UR QL: NEGATIVE — SIGNIFICANT CHANGE UP
HCG UR QL: POSITIVE — SIGNIFICANT CHANGE UP
HCT VFR BLD CALC: 35.9 % — SIGNIFICANT CHANGE UP (ref 34.5–45)
HGB BLD-MCNC: 11.8 G/DL — SIGNIFICANT CHANGE UP (ref 11.5–15.5)
IMM GRANULOCYTES NFR BLD AUTO: 0.2 % — SIGNIFICANT CHANGE UP (ref 0–1.5)
KETONES UR-MCNC: 15 MG/DL
LEUKOCYTE ESTERASE UR-ACNC: ABNORMAL
LIDOCAIN IGE QN: 107 U/L — SIGNIFICANT CHANGE UP (ref 73–393)
LYMPHOCYTES # BLD AUTO: 49.6 % — HIGH (ref 13–44)
MCHC RBC-ENTMCNC: 28 PG — SIGNIFICANT CHANGE UP (ref 27–34)
MCHC RBC-ENTMCNC: 32.9 G/DL — SIGNIFICANT CHANGE UP (ref 32–36)
MCV RBC AUTO: 85.1 FL — SIGNIFICANT CHANGE UP (ref 80–100)
MONOCYTES NFR BLD AUTO: 8.9 % — SIGNIFICANT CHANGE UP (ref 2–14)
NEUTROPHILS NFR BLD AUTO: 39.9 % — LOW (ref 43–77)
NITRITE UR-MCNC: NEGATIVE — SIGNIFICANT CHANGE UP
PH UR: 6 — SIGNIFICANT CHANGE UP (ref 5–8)
PLATELET # BLD AUTO: 246 K/UL — SIGNIFICANT CHANGE UP (ref 150–400)
POTASSIUM SERPL-MCNC: 3.6 MMOL/L — SIGNIFICANT CHANGE UP (ref 3.5–5.3)
POTASSIUM SERPL-SCNC: 3.6 MMOL/L — SIGNIFICANT CHANGE UP (ref 3.5–5.3)
PROT SERPL-MCNC: 7.2 G/DL — SIGNIFICANT CHANGE UP (ref 6.4–8.2)
PROT UR-MCNC: ABNORMAL MG/DL
RBC # BLD: 4.22 M/UL — SIGNIFICANT CHANGE UP (ref 3.8–5.2)
RBC # FLD: 13 % — SIGNIFICANT CHANGE UP (ref 10.3–16.9)
RBC CASTS # UR COMP ASSIST: < 5 /HPF — SIGNIFICANT CHANGE UP
SODIUM SERPL-SCNC: 140 MMOL/L — SIGNIFICANT CHANGE UP (ref 132–145)
SP GR SPEC: >=1.03 — SIGNIFICANT CHANGE UP (ref 1–1.03)
UROBILINOGEN FLD QL: 2 E.U./DL
WBC # BLD: 4 K/UL — SIGNIFICANT CHANGE UP (ref 3.8–10.5)
WBC # FLD AUTO: 4 K/UL — SIGNIFICANT CHANGE UP (ref 3.8–10.5)
WBC UR QL: > 10 /HPF

## 2018-09-07 PROCEDURE — 99284 EMERGENCY DEPT VISIT MOD MDM: CPT

## 2018-09-07 RX ORDER — NITROFURANTOIN MACROCRYSTAL 50 MG
100 CAPSULE ORAL ONCE
Refills: 0 | Status: COMPLETED | OUTPATIENT
Start: 2018-09-07 | End: 2018-09-07

## 2018-09-07 RX ORDER — METRONIDAZOLE 7.5 MG/G
1 GEL VAGINAL
Qty: 7 | Refills: 0
Start: 2018-09-07 | End: 2018-09-13

## 2018-09-07 RX ORDER — NITROFURANTOIN MACROCRYSTAL 50 MG
1 CAPSULE ORAL
Qty: 14 | Refills: 0
Start: 2018-09-07 | End: 2018-09-13

## 2018-09-07 RX ADMIN — Medication 100 MILLIGRAM(S): at 23:22

## 2018-09-07 NOTE — ED ADULT NURSE NOTE - NSIMPLEMENTINTERV_GEN_ALL_ED
Implemented All Universal Safety Interventions:  [place] to call system. Call bell, personal items and telephone within reach. Instruct patient to call for assistance. Room bathroom lighting operational. Non-slip footwear when patient is off stretcher. Physically safe environment: no spills, clutter or unnecessary equipment. Stretcher in lowest position, wheels locked, appropriate side rails in place.

## 2018-09-07 NOTE — ED PROVIDER NOTE - PROGRESS NOTE DETAILS
+Upreg, non tender abd, pt's ua w signs of UTI, UCX added, trich on UA, will cover w metrogel. F/U w OB GYN for further care

## 2018-09-07 NOTE — ED PROVIDER NOTE - OBJECTIVE STATEMENT
23 yo F with no PMHx presents c/o nausea and constipation. Pt states has felt nausea for the past few days, feels bloated, and has been unable to pass a bowel movement for 3 days. Pt admits that she may be pregnant and recently removed IUD in June with LNMP in middle of July. Denies diarrhea, fever, chills, or other complaints.

## 2018-09-07 NOTE — ED PROVIDER NOTE - MEDICAL DECISION MAKING DETAILS
Pt is a 23 yo F with no PMHx presents c/o worsening nausea and constipation for the past 3 days. Will conduct labs, including u-preg, and reassess.

## 2018-09-07 NOTE — ED ADULT NURSE NOTE - OBJECTIVE STATEMENT
Pt presents to ED c/o nausea, no episodes of emesis and unable to have bowel movement x3 days. Pt denies any abdominal or back pain, no fever/chills, no CP/SOB.

## 2018-09-07 NOTE — ED ADULT NURSE NOTE - CHPI ED NUR SYMPTOMS NEG
no abdominal distension/no blood in stool/no burning urination/no chills/no diarrhea/no dysuria/no fever/no hematuria/no vomiting

## 2018-09-11 ENCOUNTER — EMERGENCY (EMERGENCY)
Facility: HOSPITAL | Age: 23
LOS: 1 days | Discharge: HOME | End: 2018-09-11
Admitting: EMERGENCY MEDICINE
Payer: MEDICAID

## 2018-09-11 VITALS
RESPIRATION RATE: 20 BRPM | WEIGHT: 279.99 LBS | OXYGEN SATURATION: 100 % | HEART RATE: 74 BPM | TEMPERATURE: 98 F | HEIGHT: 66 IN

## 2018-09-11 DIAGNOSIS — K21.9 GASTRO-ESOPHAGEAL REFLUX DISEASE WITHOUT ESOPHAGITIS: ICD-10-CM

## 2018-09-11 DIAGNOSIS — Z79.1 LONG TERM (CURRENT) USE OF NON-STEROIDAL ANTI-INFLAMMATORIES (NSAID): ICD-10-CM

## 2018-09-11 DIAGNOSIS — Z3A.00 WEEKS OF GESTATION OF PREGNANCY NOT SPECIFIED: ICD-10-CM

## 2018-09-11 DIAGNOSIS — O99.611 DISEASES OF THE DIGESTIVE SYSTEM COMPLICATING PREGNANCY, FIRST TRIMESTER: ICD-10-CM

## 2018-09-11 DIAGNOSIS — Z79.2 LONG TERM (CURRENT) USE OF ANTIBIOTICS: ICD-10-CM

## 2018-09-11 DIAGNOSIS — R10.9 UNSPECIFIED ABDOMINAL PAIN: ICD-10-CM

## 2018-09-11 DIAGNOSIS — Z79.899 OTHER LONG TERM (CURRENT) DRUG THERAPY: ICD-10-CM

## 2018-09-11 PROCEDURE — 99283 EMERGENCY DEPT VISIT LOW MDM: CPT | Mod: 25

## 2018-09-11 NOTE — ED ADULT TRIAGE NOTE - CHIEF COMPLAINT QUOTE
ambulates into ed complaining of upper transverse area abdominal pain which started this morning.  admits to pregnancy.  No vag bleeding noted.

## 2018-09-12 VITALS
OXYGEN SATURATION: 100 % | RESPIRATION RATE: 80 BRPM | DIASTOLIC BLOOD PRESSURE: 79 MMHG | HEART RATE: 70 BPM | SYSTOLIC BLOOD PRESSURE: 106 MMHG

## 2018-09-12 RX ORDER — FAMOTIDINE 10 MG/ML
20 INJECTION INTRAVENOUS ONCE
Refills: 0 | Status: COMPLETED | OUTPATIENT
Start: 2018-09-12 | End: 2018-09-12

## 2018-09-12 RX ADMIN — FAMOTIDINE 20 MILLIGRAM(S): 10 INJECTION INTRAVENOUS at 00:39

## 2018-09-12 RX ADMIN — Medication 30 MILLILITER(S): at 00:40

## 2018-09-12 NOTE — ED PROVIDER NOTE - MEDICAL DECISION MAKING DETAILS
Pt given pepcid/maalox. Ate several sandwiches and cookies. Will see GYN in 1 day. Stressed importance of taking abx. Discussed acid reflux. Counseled to return is sxs worsen

## 2018-09-12 NOTE — ED ADULT NURSE NOTE - CHPI ED NUR SYMPTOMS NEG
no abdominal distension/no blood in stool/no burning urination/no chills/no diarrhea/no fever/no hematuria/no nausea/no vomiting

## 2018-09-12 NOTE — ED PROVIDER NOTE - OBJECTIVE STATEMENT
23 y/o F     no pmhx  diagnosed +pregnancy here last week. LMP ~6 weeks ago. Pt also diagnosed on the 7th with trichomonas and UTI. Filled Rx yesterday and has taken 2 doses of macrobid and metrogel. States sxs are improving. Denies fevers or chills. Denies pelvic pain or vaginal bleeding. Denies any lower abd pain. Today endorsing some belching and burning to epigastrium. No other medical complaint. Has appt with OBGYN on thursday.

## 2018-09-17 ENCOUNTER — EMERGENCY (EMERGENCY)
Facility: HOSPITAL | Age: 23
LOS: 1 days | Discharge: HOME | End: 2018-09-17
Attending: EMERGENCY MEDICINE | Admitting: EMERGENCY MEDICINE
Payer: MEDICAID

## 2018-09-17 VITALS
TEMPERATURE: 99 F | OXYGEN SATURATION: 100 % | HEART RATE: 75 BPM | SYSTOLIC BLOOD PRESSURE: 109 MMHG | DIASTOLIC BLOOD PRESSURE: 70 MMHG | RESPIRATION RATE: 18 BRPM

## 2018-09-17 DIAGNOSIS — Z79.899 OTHER LONG TERM (CURRENT) DRUG THERAPY: ICD-10-CM

## 2018-09-17 DIAGNOSIS — E86.0 DEHYDRATION: ICD-10-CM

## 2018-09-17 DIAGNOSIS — R11.2 NAUSEA WITH VOMITING, UNSPECIFIED: ICD-10-CM

## 2018-09-17 DIAGNOSIS — O21.1 HYPEREMESIS GRAVIDARUM WITH METABOLIC DISTURBANCE: ICD-10-CM

## 2018-09-17 DIAGNOSIS — Z79.2 LONG TERM (CURRENT) USE OF ANTIBIOTICS: ICD-10-CM

## 2018-09-17 DIAGNOSIS — Z79.51 LONG TERM (CURRENT) USE OF INHALED STEROIDS: ICD-10-CM

## 2018-09-17 DIAGNOSIS — Z79.1 LONG TERM (CURRENT) USE OF NON-STEROIDAL ANTI-INFLAMMATORIES (NSAID): ICD-10-CM

## 2018-09-17 DIAGNOSIS — Z3A.01 LESS THAN 8 WEEKS GESTATION OF PREGNANCY: ICD-10-CM

## 2018-09-17 LAB
ALBUMIN SERPL ELPH-MCNC: 3.7 G/DL — SIGNIFICANT CHANGE UP (ref 3.4–5)
ALP SERPL-CCNC: 59 U/L — SIGNIFICANT CHANGE UP (ref 40–120)
ALT FLD-CCNC: 13 U/L — SIGNIFICANT CHANGE UP (ref 12–42)
ANION GAP SERPL CALC-SCNC: 9 MMOL/L — SIGNIFICANT CHANGE UP (ref 9–16)
AST SERPL-CCNC: 9 U/L — LOW (ref 15–37)
BASOPHILS NFR BLD AUTO: 0.3 % — SIGNIFICANT CHANGE UP (ref 0–2)
BILIRUB SERPL-MCNC: 0.3 MG/DL — SIGNIFICANT CHANGE UP (ref 0.2–1.2)
BUN SERPL-MCNC: 10 MG/DL — SIGNIFICANT CHANGE UP (ref 7–23)
CALCIUM SERPL-MCNC: 8.9 MG/DL — SIGNIFICANT CHANGE UP (ref 8.5–10.5)
CHLORIDE SERPL-SCNC: 102 MMOL/L — SIGNIFICANT CHANGE UP (ref 96–108)
CO2 SERPL-SCNC: 25 MMOL/L — SIGNIFICANT CHANGE UP (ref 22–31)
CREAT SERPL-MCNC: 0.81 MG/DL — SIGNIFICANT CHANGE UP (ref 0.5–1.3)
EOSINOPHIL NFR BLD AUTO: 1.9 % — SIGNIFICANT CHANGE UP (ref 0–6)
GLUCOSE SERPL-MCNC: 90 MG/DL — SIGNIFICANT CHANGE UP (ref 70–99)
HCG SERPL-ACNC: HIGH MIU/ML
HCT VFR BLD CALC: 36.1 % — SIGNIFICANT CHANGE UP (ref 34.5–45)
HGB BLD-MCNC: 12 G/DL — SIGNIFICANT CHANGE UP (ref 11.5–15.5)
IMM GRANULOCYTES NFR BLD AUTO: 0.2 % — SIGNIFICANT CHANGE UP (ref 0–1.5)
LIDOCAIN IGE QN: 140 U/L — SIGNIFICANT CHANGE UP (ref 73–393)
LYMPHOCYTES # BLD AUTO: 38.6 % — SIGNIFICANT CHANGE UP (ref 13–44)
MAGNESIUM SERPL-MCNC: 1.7 MG/DL — SIGNIFICANT CHANGE UP (ref 1.6–2.6)
MCHC RBC-ENTMCNC: 28.1 PG — SIGNIFICANT CHANGE UP (ref 27–34)
MCHC RBC-ENTMCNC: 33.2 G/DL — SIGNIFICANT CHANGE UP (ref 32–36)
MCV RBC AUTO: 84.5 FL — SIGNIFICANT CHANGE UP (ref 80–100)
MONOCYTES NFR BLD AUTO: 8.4 % — SIGNIFICANT CHANGE UP (ref 2–14)
NEUTROPHILS NFR BLD AUTO: 50.6 % — SIGNIFICANT CHANGE UP (ref 43–77)
PLATELET # BLD AUTO: 231 K/UL — SIGNIFICANT CHANGE UP (ref 150–400)
POTASSIUM SERPL-MCNC: 3.7 MMOL/L — SIGNIFICANT CHANGE UP (ref 3.5–5.3)
POTASSIUM SERPL-SCNC: 3.7 MMOL/L — SIGNIFICANT CHANGE UP (ref 3.5–5.3)
PROT SERPL-MCNC: 7.4 G/DL — SIGNIFICANT CHANGE UP (ref 6.4–8.2)
RBC # BLD: 4.27 M/UL — SIGNIFICANT CHANGE UP (ref 3.8–5.2)
RBC # FLD: 12.9 % — SIGNIFICANT CHANGE UP (ref 10.3–14.5)
SODIUM SERPL-SCNC: 136 MMOL/L — SIGNIFICANT CHANGE UP (ref 132–145)
WBC # BLD: 5.8 K/UL — SIGNIFICANT CHANGE UP (ref 3.8–10.5)
WBC # FLD AUTO: 5.8 K/UL — SIGNIFICANT CHANGE UP (ref 3.8–10.5)

## 2018-09-17 PROCEDURE — 76815 OB US LIMITED FETUS(S): CPT | Mod: 26

## 2018-09-17 PROCEDURE — 99218: CPT

## 2018-09-17 PROCEDURE — 76817 TRANSVAGINAL US OBSTETRIC: CPT | Mod: 26

## 2018-09-17 RX ORDER — ONDANSETRON 8 MG/1
4 TABLET, FILM COATED ORAL ONCE
Refills: 0 | Status: COMPLETED | OUTPATIENT
Start: 2018-09-17 | End: 2018-09-17

## 2018-09-17 RX ORDER — SODIUM CHLORIDE 9 MG/ML
1000 INJECTION INTRAMUSCULAR; INTRAVENOUS; SUBCUTANEOUS ONCE
Refills: 0 | Status: COMPLETED | OUTPATIENT
Start: 2018-09-17 | End: 2018-09-17

## 2018-09-17 RX ORDER — METOCLOPRAMIDE HCL 10 MG
10 TABLET ORAL ONCE
Refills: 0 | Status: COMPLETED | OUTPATIENT
Start: 2018-09-17 | End: 2018-09-17

## 2018-09-17 RX ORDER — SODIUM CHLORIDE 9 MG/ML
1000 INJECTION, SOLUTION INTRAVENOUS
Refills: 0 | Status: DISCONTINUED | OUTPATIENT
Start: 2018-09-17 | End: 2018-09-21

## 2018-09-17 RX ADMIN — Medication 10 MILLIGRAM(S): at 23:21

## 2018-09-17 RX ADMIN — SODIUM CHLORIDE 1000 MILLILITER(S): 9 INJECTION INTRAMUSCULAR; INTRAVENOUS; SUBCUTANEOUS at 21:06

## 2018-09-17 RX ADMIN — SODIUM CHLORIDE 2000 MILLILITER(S): 9 INJECTION INTRAMUSCULAR; INTRAVENOUS; SUBCUTANEOUS at 20:21

## 2018-09-17 RX ADMIN — ONDANSETRON 4 MILLIGRAM(S): 8 TABLET, FILM COATED ORAL at 21:45

## 2018-09-17 RX ADMIN — SODIUM CHLORIDE 150 MILLILITER(S): 9 INJECTION, SOLUTION INTRAVENOUS at 21:45

## 2018-09-17 RX ADMIN — SODIUM CHLORIDE 150 MILLILITER(S): 9 INJECTION, SOLUTION INTRAVENOUS at 23:21

## 2018-09-17 NOTE — ED CDU PROVIDER INITIAL DAY NOTE - OBJECTIVE STATEMENT
23 y/o female with PMHx of UTI presents to ED c/o vomiting and nausea x 4 days. Patient reports she has been vomiting every time she eats, with no associated diarrhea of abd pain. States that she is currently pregnant (unsure of how far along it is, LMP in July) and has not seen the GYN doctor yet. Patient was seen here on 9/7/18 and diagnosed with Trichomoniasis and UTI, and was Rxed Macrobid and Metrogel, and supposed to have GYN appointment on 9/13 but did not f/u. She is currently denying any vaginal discharge, pain, irritation, or itching.

## 2018-09-17 NOTE — ED PROVIDER NOTE - ATTENDING CONTRIBUTION TO CARE
23 yo female presents with n/v in her first trimester.  Denies hx of hyperemesis.  No diarrhea, fever, abdominal pain.  US shows IUP 7w5d.  Normal lytes.  Ketonuria present.  Started on d51/2 NS + antiemetics prn.  Observation for hydration, PO trial, nausea control.  Appears comfortable and well.  No abdominal tenderness.  No vomiting since arrival.

## 2018-09-17 NOTE — ED PROVIDER NOTE - OBJECTIVE STATEMENT
21 y/o female with PMHx of UTI presents to ED c/o vomiting and nausea x 4 days. Patient reports she has been vomiting every time she eats, with no associated diarrhea of abd pain. States that she is currently pregnant (unsure of how far along it is, LMP in July) and has not seen the GYN doctor yet. Patient was seen here on 9/7/18 and diagnosed with Trichomoniasis and UTI, and was Rxed Macrobid and Metrogel, and supposed to have GYN appointment on 9/13 but did not f/u. She is currently denying any vaginal discharge, pain, irritation, or itching.

## 2018-09-17 NOTE — ED ADULT NURSE REASSESSMENT NOTE - NS ED NURSE REASSESS COMMENT FT1
Pt resting comfortably in bed. IV placed. Pt states that she has not been able to keep any food down for the past 4 days. Pt states that she is currently hungry. Pending labs and further evaluation. Will continue to monitor.

## 2018-09-17 NOTE — ED CDU PROVIDER INITIAL DAY NOTE - DETAILS
pt with dehydration, n/v in the setting of pregnancy; pt with uti, on macrobid.  will hydrate with IVF; obgyn f/u. single IUP aprox 7 weeks gestation

## 2018-09-17 NOTE — ED ADULT NURSE REASSESSMENT NOTE - NS ED NURSE REASSESS COMMENT FT1
Pt states that she is feeling better. Pt states that she was able to keep down some apple juice. Will continue to monitor.

## 2018-09-17 NOTE — ED CDU PROVIDER INITIAL DAY NOTE - PROGRESS NOTE DETAILS
pt tolerating po currently, no N/V and voiding fine, on macrobid for her known UTI, desires to go home , no abd tenderness noted, has f/u with her OB in 2d

## 2018-09-17 NOTE — ED PROVIDER NOTE - MEDICAL DECISION MAKING DETAILS
Patient with 4 days of nausea and vomiting, noted to be pregnant but has not obtained any prenatal care yet. Will obtain basic labs including beta, perform US, and reassess.

## 2018-09-17 NOTE — ED PROVIDER NOTE - PMH
IUD (intrauterine device) in place  IUD was removed in July 2018  Obesity    Trichomoniasis    UTI (urinary tract infection)

## 2018-09-18 VITALS
RESPIRATION RATE: 18 BRPM | TEMPERATURE: 98 F | HEART RATE: 68 BPM | SYSTOLIC BLOOD PRESSURE: 114 MMHG | OXYGEN SATURATION: 98 % | DIASTOLIC BLOOD PRESSURE: 65 MMHG

## 2018-09-18 PROCEDURE — 99217: CPT

## 2018-09-18 RX ORDER — METOCLOPRAMIDE HCL 10 MG
1 TABLET ORAL
Qty: 12 | Refills: 0
Start: 2018-09-18 | End: 2018-10-17

## 2018-09-18 NOTE — ED CDU PROVIDER DISPOSITION NOTE - CLINICAL COURSE
23 y/o female with PMHx of UTI presents to ED c/o vomiting and nausea x 4 days. Patient reports she has been vomiting every time she eats, with no associated diarrhea of abd pain. States that she is currently pregnant (unsure of how far along it is, LMP in July) and has not seen the GYN doctor yet. Patient was seen here on 9/7/18 and diagnosed with Trichomoniasis and UTI, and was Rxed Macrobid and Metrogel, and supposed to have GYN appointment on 9/13 but did not f/u. Placed in obs for hyperemesis and UTI, s/p  D51/2NS with improvement in sx, pt tolerating po currently, no N/V and voiding fine, on macrobid for her known UTI, desires to go home, no abd tenderness noted, has f/u with her OB in 2d

## 2018-10-26 ENCOUNTER — EMERGENCY (EMERGENCY)
Facility: HOSPITAL | Age: 23
LOS: 1 days | Discharge: HOME | End: 2018-10-26
Admitting: EMERGENCY MEDICINE
Payer: MEDICAID

## 2018-10-26 VITALS
RESPIRATION RATE: 16 BRPM | TEMPERATURE: 98 F | DIASTOLIC BLOOD PRESSURE: 54 MMHG | SYSTOLIC BLOOD PRESSURE: 94 MMHG | OXYGEN SATURATION: 98 % | HEART RATE: 58 BPM

## 2018-10-26 VITALS
RESPIRATION RATE: 17 BRPM | DIASTOLIC BLOOD PRESSURE: 73 MMHG | TEMPERATURE: 98 F | HEART RATE: 98 BPM | SYSTOLIC BLOOD PRESSURE: 105 MMHG | OXYGEN SATURATION: 100 %

## 2018-10-26 LAB
APPEARANCE UR: CLEAR — SIGNIFICANT CHANGE UP
BILIRUB UR-MCNC: NEGATIVE — SIGNIFICANT CHANGE UP
COLOR SPEC: YELLOW — SIGNIFICANT CHANGE UP
DIFF PNL FLD: ABNORMAL
GLUCOSE UR QL: NEGATIVE — SIGNIFICANT CHANGE UP
HCG UR QL: POSITIVE — SIGNIFICANT CHANGE UP
KETONES UR-MCNC: NEGATIVE — SIGNIFICANT CHANGE UP
LEUKOCYTE ESTERASE UR-ACNC: ABNORMAL
NITRITE UR-MCNC: NEGATIVE — SIGNIFICANT CHANGE UP
PH UR: 6 — SIGNIFICANT CHANGE UP (ref 5–8)
PROT UR-MCNC: NEGATIVE MG/DL — SIGNIFICANT CHANGE UP
SP GR SPEC: 1.02 — SIGNIFICANT CHANGE UP (ref 1–1.03)
UROBILINOGEN FLD QL: 1 E.U./DL — SIGNIFICANT CHANGE UP

## 2018-10-26 PROCEDURE — 99283 EMERGENCY DEPT VISIT LOW MDM: CPT

## 2018-10-26 RX ORDER — ACETAMINOPHEN 500 MG
975 TABLET ORAL ONCE
Refills: 0 | Status: COMPLETED | OUTPATIENT
Start: 2018-10-26 | End: 2018-10-26

## 2018-10-26 RX ORDER — NITROFURANTOIN MACROCRYSTAL 50 MG
1 CAPSULE ORAL
Qty: 20 | Refills: 0
Start: 2018-10-26 | End: 2018-11-04

## 2018-10-26 RX ORDER — NITROFURANTOIN MACROCRYSTAL 50 MG
100 CAPSULE ORAL ONCE
Refills: 0 | Status: COMPLETED | OUTPATIENT
Start: 2018-10-26 | End: 2018-10-26

## 2018-10-26 RX ADMIN — Medication 975 MILLIGRAM(S): at 17:51

## 2018-10-26 RX ADMIN — Medication 975 MILLIGRAM(S): at 20:53

## 2018-10-26 RX ADMIN — Medication 100 MILLIGRAM(S): at 20:52

## 2018-10-26 NOTE — ED ADULT NURSE NOTE - DOES PATIENT HAVE ADVANCE DIRECTIVE
push fluids, warm salt water gargles  Discussed contagiousness  Tylenol or motrin  If not improving after 2-3 days then return to clinic  
No

## 2018-10-26 NOTE — ED ADULT NURSE NOTE - NSIMPLEMENTINTERV_GEN_ALL_ED
R/O diabetic foot inf.
Implemented All Universal Safety Interventions:  [place] to call system. Call bell, personal items and telephone within reach. Instruct patient to call for assistance. Room bathroom lighting operational. Non-slip footwear when patient is off stretcher. Physically safe environment: no spills, clutter or unnecessary equipment. Stretcher in lowest position, wheels locked, appropriate side rails in place.

## 2018-10-26 NOTE — ED PROVIDER NOTE - OBJECTIVE STATEMENT
21 y/o female with PMHx of obesity, trichomoniasis, and UTI presents to the ED with complaints of frontal headache and lower back pain for the past 2 days. Pt has associated Sx of frequent urination. No nausea, no vomiting, no abd pain, no fever, no chills, no vaginal bleeding, no vision changes, and no weakness. First pregnancy was full term with no complications. G2_P2 with LNMP sometime in June of this year.

## 2018-10-26 NOTE — ED PROVIDER NOTE - CONSTITUTIONAL, MLM
normal... Well appearing, well nourished, awake, alert, oriented to person, place, time/situation and in no apparent distress. Obese

## 2018-10-26 NOTE — ED PROVIDER NOTE - MEDICAL DECISION MAKING DETAILS
Vital signs stable. Well, non-toxic appearing. Will check UA to check pregnancy status. Will give Tylenol for HA. No clinical indication for lab or imaging at this time. Follow up with gynecologist next week and will reassess.

## 2018-10-26 NOTE — ED PROVIDER NOTE - CHPI ED SYMPTOMS NEG
no nausea, no vomiting, no abd pain, no fever, no chills, no vaginal bleeding, no vision changes, no

## 2018-10-26 NOTE — ED PROVIDER NOTE - NEUROLOGICAL, MLM
Alert and oriented, no focal deficits, no motor or sensory deficits. No gait abnormalities. Sensation intact to all extremities. 5/5 strength to all extremities.

## 2018-10-30 DIAGNOSIS — R51 HEADACHE: ICD-10-CM

## 2018-10-30 DIAGNOSIS — Z79.899 OTHER LONG TERM (CURRENT) DRUG THERAPY: ICD-10-CM

## 2018-10-30 DIAGNOSIS — Z3A.00 WEEKS OF GESTATION OF PREGNANCY NOT SPECIFIED: ICD-10-CM

## 2018-10-30 DIAGNOSIS — O23.40 UNSPECIFIED INFECTION OF URINARY TRACT IN PREGNANCY, UNSPECIFIED TRIMESTER: ICD-10-CM

## 2018-10-30 DIAGNOSIS — Z79.1 LONG TERM (CURRENT) USE OF NON-STEROIDAL ANTI-INFLAMMATORIES (NSAID): ICD-10-CM

## 2018-10-30 DIAGNOSIS — Z79.2 LONG TERM (CURRENT) USE OF ANTIBIOTICS: ICD-10-CM

## 2018-11-11 ENCOUNTER — EMERGENCY (EMERGENCY)
Facility: HOSPITAL | Age: 23
LOS: 1 days | Discharge: HOME | End: 2018-11-11
Attending: EMERGENCY MEDICINE | Admitting: EMERGENCY MEDICINE
Payer: MEDICAID

## 2018-11-11 VITALS
SYSTOLIC BLOOD PRESSURE: 100 MMHG | HEART RATE: 82 BPM | OXYGEN SATURATION: 96 % | DIASTOLIC BLOOD PRESSURE: 62 MMHG | RESPIRATION RATE: 16 BRPM | TEMPERATURE: 97 F

## 2018-11-11 DIAGNOSIS — R10.13 EPIGASTRIC PAIN: ICD-10-CM

## 2018-11-11 DIAGNOSIS — Z79.2 LONG TERM (CURRENT) USE OF ANTIBIOTICS: ICD-10-CM

## 2018-11-11 DIAGNOSIS — R10.12 LEFT UPPER QUADRANT PAIN: ICD-10-CM

## 2018-11-11 DIAGNOSIS — Z79.899 OTHER LONG TERM (CURRENT) DRUG THERAPY: ICD-10-CM

## 2018-11-11 DIAGNOSIS — O26.892 OTHER SPECIFIED PREGNANCY RELATED CONDITIONS, SECOND TRIMESTER: ICD-10-CM

## 2018-11-11 DIAGNOSIS — Z79.1 LONG TERM (CURRENT) USE OF NON-STEROIDAL ANTI-INFLAMMATORIES (NSAID): ICD-10-CM

## 2018-11-11 DIAGNOSIS — Z3A.16 16 WEEKS GESTATION OF PREGNANCY: ICD-10-CM

## 2018-11-11 PROCEDURE — 99285 EMERGENCY DEPT VISIT HI MDM: CPT | Mod: 25

## 2018-11-11 NOTE — ED ADULT TRIAGE NOTE - CHIEF COMPLAINT QUOTE
Pt with complaint of upper abdominal pain that woke her up from sleep tonight. Pt is 16 weeks pregnant. Denies vaginal bleeding.

## 2018-11-11 NOTE — ED ADULT NURSE NOTE - NSIMPLEMENTINTERV_GEN_ALL_ED
Implemented All Universal Safety Interventions:  [place] to call system. Call bell, personal items and telephone within reach. Instruct patient to call for assistance. Room bathroom lighting operational. Non-slip footwear when patient is off stretcher. Physically safe environment: no spills, clutter or unnecessary equipment. Stretcher in lowest position, wheels locked, appropriate side rails in place.

## 2018-11-11 NOTE — ED ADULT NURSE NOTE - CHPI ED NUR SYMPTOMS NEG
no abdominal distension/no blood in stool/no burning urination/no chills/no diarrhea/no dysuria/no fever/no hematuria/no nausea/no vomiting

## 2018-11-11 NOTE — ED ADULT NURSE NOTE - NSFALLRSKHARMRISK_ED_ALL_ED
Nutrition Counseling:  Pt referred by ERIN Wu. Attempted to call practitioner to discuss continuation of pt care regarding pt's referral and next steps for pt's appropriate treatment. Will call in the morning per advice from referral office staff.     Carito Abraham MS, [address] [place] [address], [address] [place] [address] Rd, LD  W: [phone]  C: [phone]
no

## 2018-11-11 NOTE — ED ADULT NURSE NOTE - OBJECTIVE STATEMENT
pt is a 22 year old female who comes into the ed c/o Right upper abd pain. pt reports sudden onset sharp pain in her right upper quadrant for approx 2 hours. pt is approx 4 months pregnant, was going for her first check up tomorrow. pt . pt denies vaginal bleeding, discharge, fevers, chills, nausea, vomiting, diarrhea.

## 2018-11-12 ENCOUNTER — EMERGENCY (EMERGENCY)
Facility: HOSPITAL | Age: 23
LOS: 1 days | Discharge: HOME | End: 2018-11-12
Attending: EMERGENCY MEDICINE | Admitting: EMERGENCY MEDICINE
Payer: COMMERCIAL

## 2018-11-12 VITALS
HEART RATE: 70 BPM | RESPIRATION RATE: 16 BRPM | TEMPERATURE: 99 F | DIASTOLIC BLOOD PRESSURE: 79 MMHG | OXYGEN SATURATION: 100 % | SYSTOLIC BLOOD PRESSURE: 112 MMHG

## 2018-11-12 VITALS
DIASTOLIC BLOOD PRESSURE: 66 MMHG | SYSTOLIC BLOOD PRESSURE: 98 MMHG | OXYGEN SATURATION: 100 % | RESPIRATION RATE: 16 BRPM | HEART RATE: 64 BPM | TEMPERATURE: 98 F

## 2018-11-12 VITALS
HEART RATE: 60 BPM | OXYGEN SATURATION: 100 % | TEMPERATURE: 98 F | RESPIRATION RATE: 18 BRPM | DIASTOLIC BLOOD PRESSURE: 53 MMHG | SYSTOLIC BLOOD PRESSURE: 104 MMHG

## 2018-11-12 DIAGNOSIS — Z79.2 LONG TERM (CURRENT) USE OF ANTIBIOTICS: ICD-10-CM

## 2018-11-12 DIAGNOSIS — Z79.1 LONG TERM (CURRENT) USE OF NON-STEROIDAL ANTI-INFLAMMATORIES (NSAID): ICD-10-CM

## 2018-11-12 DIAGNOSIS — O26.892 OTHER SPECIFIED PREGNANCY RELATED CONDITIONS, SECOND TRIMESTER: ICD-10-CM

## 2018-11-12 DIAGNOSIS — R10.11 RIGHT UPPER QUADRANT PAIN: ICD-10-CM

## 2018-11-12 DIAGNOSIS — Z79.899 OTHER LONG TERM (CURRENT) DRUG THERAPY: ICD-10-CM

## 2018-11-12 DIAGNOSIS — Z3A.16 16 WEEKS GESTATION OF PREGNANCY: ICD-10-CM

## 2018-11-12 DIAGNOSIS — R10.13 EPIGASTRIC PAIN: ICD-10-CM

## 2018-11-12 LAB
ALBUMIN SERPL ELPH-MCNC: 3.1 G/DL — LOW (ref 3.4–5)
ALP SERPL-CCNC: 64 U/L — SIGNIFICANT CHANGE UP (ref 40–120)
ALT FLD-CCNC: 16 U/L — SIGNIFICANT CHANGE UP (ref 12–42)
ANION GAP SERPL CALC-SCNC: 5 MMOL/L — LOW (ref 9–16)
APPEARANCE UR: CLEAR — SIGNIFICANT CHANGE UP
AST SERPL-CCNC: 13 U/L — LOW (ref 15–37)
BASOPHILS NFR BLD AUTO: 0.2 % — SIGNIFICANT CHANGE UP (ref 0–2)
BILIRUB SERPL-MCNC: 0.2 MG/DL — SIGNIFICANT CHANGE UP (ref 0.2–1.2)
BILIRUB UR-MCNC: ABNORMAL
BUN SERPL-MCNC: 10 MG/DL — SIGNIFICANT CHANGE UP (ref 7–23)
CALCIUM SERPL-MCNC: 8.7 MG/DL — SIGNIFICANT CHANGE UP (ref 8.5–10.5)
CHLORIDE SERPL-SCNC: 104 MMOL/L — SIGNIFICANT CHANGE UP (ref 96–108)
CO2 SERPL-SCNC: 26 MMOL/L — SIGNIFICANT CHANGE UP (ref 22–31)
COLOR SPEC: YELLOW — SIGNIFICANT CHANGE UP
CREAT SERPL-MCNC: 0.65 MG/DL — SIGNIFICANT CHANGE UP (ref 0.5–1.3)
DIFF PNL FLD: NEGATIVE — SIGNIFICANT CHANGE UP
EOSINOPHIL NFR BLD AUTO: 1.7 % — SIGNIFICANT CHANGE UP (ref 0–6)
GLUCOSE SERPL-MCNC: 93 MG/DL — SIGNIFICANT CHANGE UP (ref 70–99)
GLUCOSE UR QL: NEGATIVE — SIGNIFICANT CHANGE UP
HCT VFR BLD CALC: 35.1 % — SIGNIFICANT CHANGE UP (ref 34.5–45)
HGB BLD-MCNC: 11.6 G/DL — SIGNIFICANT CHANGE UP (ref 11.5–15.5)
IMM GRANULOCYTES NFR BLD AUTO: 0.2 % — SIGNIFICANT CHANGE UP (ref 0–1.5)
KETONES UR-MCNC: ABNORMAL MG/DL
LEUKOCYTE ESTERASE UR-ACNC: ABNORMAL
LIDOCAIN IGE QN: 139 U/L — SIGNIFICANT CHANGE UP (ref 73–393)
LYMPHOCYTES # BLD AUTO: 29.2 % — SIGNIFICANT CHANGE UP (ref 13–44)
MCHC RBC-ENTMCNC: 28.2 PG — SIGNIFICANT CHANGE UP (ref 27–34)
MCHC RBC-ENTMCNC: 33 G/DL — SIGNIFICANT CHANGE UP (ref 32–36)
MCV RBC AUTO: 85.4 FL — SIGNIFICANT CHANGE UP (ref 80–100)
MONOCYTES NFR BLD AUTO: 9.6 % — SIGNIFICANT CHANGE UP (ref 2–14)
NEUTROPHILS NFR BLD AUTO: 59.1 % — SIGNIFICANT CHANGE UP (ref 43–77)
NITRITE UR-MCNC: NEGATIVE — SIGNIFICANT CHANGE UP
PH UR: 6 — SIGNIFICANT CHANGE UP (ref 5–8)
PLATELET # BLD AUTO: 245 K/UL — SIGNIFICANT CHANGE UP (ref 150–400)
POTASSIUM SERPL-MCNC: 3.9 MMOL/L — SIGNIFICANT CHANGE UP (ref 3.5–5.3)
POTASSIUM SERPL-SCNC: 3.9 MMOL/L — SIGNIFICANT CHANGE UP (ref 3.5–5.3)
PROT SERPL-MCNC: 7 G/DL — SIGNIFICANT CHANGE UP (ref 6.4–8.2)
PROT UR-MCNC: NEGATIVE MG/DL — SIGNIFICANT CHANGE UP
RBC # BLD: 4.11 M/UL — SIGNIFICANT CHANGE UP (ref 3.8–5.2)
RBC # FLD: 12.7 % — SIGNIFICANT CHANGE UP (ref 10.3–14.5)
SODIUM SERPL-SCNC: 135 MMOL/L — SIGNIFICANT CHANGE UP (ref 132–145)
SP GR SPEC: >=1.03 — SIGNIFICANT CHANGE UP (ref 1–1.03)
UROBILINOGEN FLD QL: 2 E.U./DL
WBC # BLD: 5.4 K/UL — SIGNIFICANT CHANGE UP (ref 3.8–10.5)
WBC # FLD AUTO: 5.4 K/UL — SIGNIFICANT CHANGE UP (ref 3.8–10.5)

## 2018-11-12 PROCEDURE — 76817 TRANSVAGINAL US OBSTETRIC: CPT | Mod: 26

## 2018-11-12 PROCEDURE — 96374 THER/PROPH/DIAG INJ IV PUSH: CPT

## 2018-11-12 PROCEDURE — 76705 ECHO EXAM OF ABDOMEN: CPT | Mod: 26

## 2018-11-12 PROCEDURE — 76805 OB US >/= 14 WKS SNGL FETUS: CPT

## 2018-11-12 PROCEDURE — 99284 EMERGENCY DEPT VISIT MOD MDM: CPT | Mod: 25

## 2018-11-12 PROCEDURE — 76805 OB US >/= 14 WKS SNGL FETUS: CPT | Mod: 26

## 2018-11-12 PROCEDURE — 76705 ECHO EXAM OF ABDOMEN: CPT

## 2018-11-12 PROCEDURE — 76817 TRANSVAGINAL US OBSTETRIC: CPT

## 2018-11-12 RX ORDER — ACETAMINOPHEN 500 MG
1000 TABLET ORAL ONCE
Refills: 0 | Status: COMPLETED | OUTPATIENT
Start: 2018-11-12 | End: 2018-11-12

## 2018-11-12 RX ORDER — SODIUM CHLORIDE 9 MG/ML
1000 INJECTION INTRAMUSCULAR; INTRAVENOUS; SUBCUTANEOUS ONCE
Refills: 0 | Status: COMPLETED | OUTPATIENT
Start: 2018-11-12 | End: 2018-11-12

## 2018-11-12 RX ORDER — FAMOTIDINE 10 MG/ML
1 INJECTION INTRAVENOUS
Qty: 14 | Refills: 0
Start: 2018-11-12 | End: 2018-11-18

## 2018-11-12 RX ORDER — ACETAMINOPHEN 500 MG
975 TABLET ORAL ONCE
Refills: 0 | Status: COMPLETED | OUTPATIENT
Start: 2018-11-12 | End: 2018-11-12

## 2018-11-12 RX ORDER — CEFAZOLIN SODIUM 1 G
1000 VIAL (EA) INJECTION ONCE
Refills: 0 | Status: COMPLETED | OUTPATIENT
Start: 2018-11-12 | End: 2018-11-12

## 2018-11-12 RX ORDER — FAMOTIDINE 10 MG/ML
20 INJECTION INTRAVENOUS ONCE
Refills: 0 | Status: COMPLETED | OUTPATIENT
Start: 2018-11-12 | End: 2018-11-12

## 2018-11-12 RX ADMIN — FAMOTIDINE 20 MILLIGRAM(S): 10 INJECTION INTRAVENOUS at 01:24

## 2018-11-12 RX ADMIN — Medication 400 MILLIGRAM(S): at 09:13

## 2018-11-12 RX ADMIN — Medication 30 MILLILITER(S): at 01:24

## 2018-11-12 RX ADMIN — Medication 100 MILLIGRAM(S): at 02:13

## 2018-11-12 RX ADMIN — Medication 975 MILLIGRAM(S): at 01:24

## 2018-11-12 RX ADMIN — SODIUM CHLORIDE 1000 MILLILITER(S): 9 INJECTION INTRAMUSCULAR; INTRAVENOUS; SUBCUTANEOUS at 01:24

## 2018-11-12 NOTE — ED PROVIDER NOTE - OBJECTIVE STATEMENT
The pt is a 23 y/o F, BIBA - transfer from [facility], for RUQ us - seen there and found to be tender hence sent to r/o gallstones, [facility] also consulted gyn prior to transfer. Pt is ~ 16 wks gravid, .  States that woke up with mid abd pain, radiates to both sides, pain is 6/10, was given maalox and tyl w/some relief. Denies vag bleeding, n/v/d, cp, sob, fevers, chills, dysuria

## 2018-11-12 NOTE — CONSULT NOTE ADULT - ASSESSMENT
21 y/o @17w1d by LMP who presents for RUQ tenderness  - US negative for gallstones   - most likely pain secondary to GERD/reflux. Pain relieved with maalox. Patient encouarged to continued tums at home and to avoid spicy food  - patient given information for Dr. Eddie Vinson on [address] with [facility] physician to establish prenatal care. Importance of regular prenatal care emphasized and patient acknowledged understanding.   - ED asked to send prenatal vitamins to pharmacy for the patient

## 2018-11-12 NOTE — ED PROVIDER NOTE - MEDICAL DECISION MAKING DETAILS
pt seen at [facility] and had labs - transferred here for ruq us to r/o stones and gyn consult since no prenatal care, on my exam - benign abd, us neg as expected, no need to repeat labs since all done earlier at [facility], seen and cleared for dc by gyn

## 2018-11-12 NOTE — ED PROVIDER NOTE - OBJECTIVE STATEMENT
patient with no pmhx , LMP sometime in , 16 weeks by recent sono, presents with epigastric abdominal pain. last meal was at 6:30, went to bed normal, and awoke with dull pain to epigastric region, persistent. denies associated N/V/D, or dysuria, flank pain, cp, sob or back pain. patient recently completed antibiotics for a uti. denies hematuria, vaginal bleeding or dc. denies fever, chills. notes no prior hx of gastritis or gallstone.

## 2018-11-12 NOTE — ED PROVIDER NOTE - PMH
IUD (intrauterine device) in place  IUD was removed in July 2018  Obesity    Trichomoniasis    UTI (urinary tract infection) ATTENDING CERTIFICATION

## 2018-11-12 NOTE — ED PROVIDER NOTE - ATTENDING CONTRIBUTION TO CARE
23 y/o F, 16 weeks pregnant, transferred from [facility], for RUQ us to r/o GB pathology. Pt c/o mid abd crampy pain radiating to both sides of abd. Was given maalox and tyl w/some relief. Denies vag bleeding, n/v/d, cp, sob, fevers, chills, dysuria. Pt AAO, NAD, mild upper abd TTP, no RUQ/RLQ TTP. Labs noted. U/s negative for acute pathology. Seen by gyn and dcd with outpt f/up. Return precautions given.

## 2018-11-12 NOTE — ED PROVIDER NOTE - MEDICAL DECISION MAKING DETAILS
abdominal pain, IVFs, pepcid and GI cocktail. patient with no vaginal bleeding or dc. will do bedside sono for FHR, patient had a sono done at [facility] with IUP confirmed. do not suspect ruptured ectopic, do not suspect PID. afebrile and tolerating POs.

## 2018-11-12 NOTE — ED ADULT NURSE NOTE - OBJECTIVE STATEMENT
Pt c/o RUQ pain. Pt transferred from [facility] for further workup and ultrasound.

## 2018-11-12 NOTE — ED PROVIDER NOTE - PROGRESS NOTE DETAILS
patient with no improvement of her pain. will send to [facility] for sono, [facility]. spoke to dr fuller in ed, accepted for transfer to [facility] ED for sono and obgyn consultation. will speak to senior resident as well to inform of patient transfer and need for ob evaluation at [facility] ED. awaiting call back. spoke to dr jones and aware of the patient.

## 2018-11-12 NOTE — CONSULT NOTE ADULT - SUBJECTIVE AND OBJECTIVE BOX
23 y/o       Last Menstrual Period         PAST MEDICAL & SURGICAL HISTORY:  Trichomoniasis  UTI (urinary tract infection)  IUD (intrauterine device) in place: IUD was removed in July 2018  Obesity  No significant past surgical history      SOCIAL HISTORY: lives with child; works with [employer]     NKDA       PHYSICAL EXAM:   Vital Signs Last 24 Hrs  T(C): 36.9 (12 Nov 2018 12:42), Max: 37.2 (12 Nov 2018 06:33)  T(F): 98.4 (12 Nov 2018 12:42), Max: 99 (12 Nov 2018 06:33)  HR: 64 (12 Nov 2018 12:42) (60 - 82)  BP: 98/66 (12 Nov 2018 12:42) (98/66 - 122/80)  RR: 16 (12 Nov 2018 12:42) (16 - 18)  SpO2: 100% (12 Nov 2018 12:42) (96% - 100%)    **************************  Constitutional: Alert & Oriented x3, No acute distress, cooperative   Gastrointestinal: morbidly obese, soft, non tender, positive bowel sounds, no rebound or guarding   Extremities: no calf tenderness or swelling      LABS:                        11.6   5.4   )-----------( 245      ( 12 Nov 2018 00:41 )             35.1     11-12    135  |  104  |  10  ----------------------------<  93  3.9   |  26  |  0.65    Ca    8.7      12 Nov 2018 00:41    TPro  7.0  /  Alb  3.1<L>  /  TBili  0.2  /  DBili  x   /  AST  13<L>  /  ALT  16  /  AlkPhos  64  11-12      Urinalysis Basic - ( 12 Nov 2018 01:30 )    Color: Yellow / Appearance: Clear / SG: >=1.030 / pH: x  Gluc: x / Ketone: Trace mg/dL  / Bili: Small / Urobili: 2.0 E.U./dL   Blood: x / Protein: NEGATIVE mg/dL / Nitrite: NEGATIVE   Leuk Esterase: Small / RBC: < 5 /HPF / WBC > 10 /HPF   Sq Epi: x / Non Sq Epi: Moderate /HPF / Bacteria: Many /HPF        RADIOLOGY & ADDITIONAL STUDIES:  < from: US Echo Transvaginal, OB (11.12.18 @ 12:17) >  EXAM:  US OB GRT THAN 14 WKS 1ST GEST                          EXAM:  US OB TRANSVAGINAL                          PROCEDURE DATE:  11/12/2018          INTERPRETATION:  OBSTETRICAL ULTRASOUND - SECOND TRIMESTER dated   11/12/2018 8:36 AM    INDICATION: 22 years old with abdominal cramping, 16 wks gravid.     LMP: 7/15/2018    TECHNIQUE: Transabdominal views of the pelvis were obtained followed by   transvaginal views for better visualization of the endometrial cavity.    PRIOR STUDIES: Pelvic ultrasound 9/17/2018    FINDINGS:   By dates, the estimated gestational age is 17 weeks 1 day.    A single intrauterine gestation is visible with an average ultrasound age   of 16 weeks 3 days.   Biparietal diameter is 3.12 cm, corresponding to a gestational age of 15   weeks 6 days.  Head circumference is 12.54 cm, corresponding to a gestational age of 16   weeks 3 days.  Abdominal circumference is 10.34 cm, corresponding to a gestational age   of 16 weeks 3.  Femur length is 2.24 cm, correspondingto a gestational age of 16 weeks 5   days.  Fetal cardiac motion is visible with fetal heart rate of 158 beats per   minute.    A full fetal anatomical survey was not performed at this time since the   study was performed to evaluate the acute clinical concern.  A detailed   fetal anatomy ultrasound between 18-20 weeks of gestation is recommended.    Within that limitation, the visualized fetal anatomy, including   intracranial structures, spinal column, four-chamber view of the heart,   stomach,kidneys, umbilical cord insertion site and bladder are   demonstrated, and no abnormalities are visualized.  3-vessel umbilical   cord.    A normal amount of amniotic fluid is evident. The placenta is located    posteriorly  . No placenta previa is evident.  No subchorionic bleed is   visible.  The cervix is closed with a length of  4.92   cm.    The maternal right ovary is normal in size, measuring 2.6 x 3.1 x 2.5 cm.   No right ovarian masses are seen. The maternal left ovary is normal in   size, measuring 2.0 x 3.0 x 1.7 cm. No left ovarian masses are seen.     IMPRESSION:   Single viable intrauterine gestation in variable line with an average   ultrasound age 16 weeks 3 days. Please note the study was targeted for   the evaluation of the clinical history of abdominal cramping. A formal   fetal anatomic survey should be performed at 18-20 weeks for complete   evaluation.            "Thank you for the opportunity to participate in the care of this   patient."    QUEENIE BATES M.D., RADIOLOGY RESIDENT  This document has been electronically signed.  CARSON ZAPIEN M.D., ATTENDING RADIOLOGIST  This document has been electronically signed. Nov 12 2018 12:47PM                < end of copied text > 23 y/o @ 17w1d by LMP of 7/15/2018 sent from [facility] for RUQ tenderess, rule out gallstones. GYN consulted seconday to lack of prenatal care. Patient denies contractions, vaginal bleeding or ROM. No fetal movement appreciated yet.   Patient states that this is a desired pregnancy. She previously had a mirena IUD which she removed in  at the time of her pap smear. She says she has been unable to establish prenatal care with any institution despite calling frequently ([facility] or [place]). She has not been taking prenatal vitamins.     PAST MEDICAL & SURGICAL HISTORY:  Trichomoniasis  UTI (urinary tract infection)  IUD (intrauterine device) in place: IUD was removed in 2018  Obesity    No significant past surgical history    Ob Hz: -  , uncomplicated term delivery     SOCIAL HISTORY: lives with child; works with [employer]     NKDA; food allergy to fish       PHYSICAL EXAM:   Vital Signs Last 24 Hrs  T(C): 36.9 (2018 12:42), Max: 37.2 (2018 06:33)  T(F): 98.4 (2018 12:42), Max: 99 (2018 06:33)  HR: 64 (2018 12:42) (60 - 82)  BP: 98/66 (2018 12:42) (98/66 - 122/80)  RR: 16 (2018 12:42) (16 - 18)  SpO2: 100% (2018 12:42) (96% - 100%)    **************************  Constitutional: Alert & Oriented x3, No acute distress, cooperative   Gastrointestinal: morbidly obese, soft, non tender, positive bowel sounds, no rebound or guarding   Extremities: no calf tenderness or swelling      LABS:                        11.6   5.4   )-----------( 245      ( 2018 00:41 )             35.1         135  |  104  |  10  ----------------------------<  93  3.9   |  26  |  0.65    Ca    8.7      2018 00:41    TPro  7.0  /  Alb  3.1<L>  /  TBili  0.2  /  DBili  x   /  AST  13<L>  /  ALT  16  /  AlkPhos  64  11-12      Urinalysis Basic - ( 2018 01:30 )    Color: Yellow / Appearance: Clear / SG: >=1.030 / pH: x  Gluc: x / Ketone: Trace mg/dL  / Bili: Small / Urobili: 2.0 E.U./dL   Blood: x / Protein: NEGATIVE mg/dL / Nitrite: NEGATIVE   Leuk Esterase: Small / RBC: < 5 /HPF / WBC > 10 /HPF   Sq Epi: x / Non Sq Epi: Moderate /HPF / Bacteria: Many /HPF        RADIOLOGY & ADDITIONAL STUDIES:  < from: US Echo Transvaginal, OB (11..18 @ 12:17) >  EXAM:  US OB GRT THAN 14 WKS 1ST GEST                          EXAM:  US OB TRANSVAGINAL                          PROCEDURE DATE:  2018          INTERPRETATION:  OBSTETRICAL ULTRASOUND - SECOND TRIMESTER dated   2018 8:36 AM    INDICATION: 22 years old with abdominal cramping, 16 wks gravid.     LMP: 7/15/2018    TECHNIQUE: Transabdominal views of the pelvis were obtained followed by   transvaginal views for better visualization of the endometrial cavity.    PRIOR STUDIES: Pelvic ultrasound 2018    FINDINGS:   By dates, the estimated gestational age is 17 weeks 1 day.    A single intrauterine gestation is visible with an average ultrasound age   of 16 weeks 3 days.   Biparietal diameter is 3.12 cm, corresponding to a gestational age of 15   weeks 6 days.  Head circumference is 12.54 cm, corresponding to a gestational age of 16   weeks 3 days.  Abdominal circumference is 10.34 cm, corresponding to a gestational age   of 16 weeks 3.  Femur length is 2.24 cm, correspondingto a gestational age of 16 weeks 5   days.  Fetal cardiac motion is visible with fetal heart rate of 158 beats per   minute.    A full fetal anatomical survey was not performed at this time since the   study was performed to evaluate the acute clinical concern.  A detailed   fetal anatomy ultrasound between 18-20 weeks of gestation is recommended.    Within that limitation, the visualized fetal anatomy, including   intracranial structures, spinal column, four-chamber view of the heart,   stomach,kidneys, umbilical cord insertion site and bladder are   demonstrated, and no abnormalities are visualized.  3-vessel umbilical   cord.    A normal amount of amniotic fluid is evident. The placenta is located    posteriorly  . No placenta previa is evident.  No subchorionic bleed is   visible.  The cervix is closed with a length of  4.92   cm.    The maternal right ovary is normal in size, measuring 2.6 x 3.1 x 2.5 cm.   No right ovarian masses are seen. The maternal left ovary is normal in   size, measuring 2.0 x 3.0 x 1.7 cm. No left ovarian masses are seen.     IMPRESSION:   Single viable intrauterine gestation in variable line with an average   ultrasound age 16 weeks 3 days. Please note the study was targeted for   the evaluation of the clinical history of abdominal cramping. A formal   fetal anatomic survey should be performed at 18-20 weeks for complete   evaluation.            "Thank you for the opportunity to participate in the care of this   patient."    QUEENIE BATES M.D., RADIOLOGY RESIDENT  This document has been electronically signed.  CARSON ZAPIEN M.D., ATTENDING RADIOLOGIST  This document has been electronically signed. 2018 12:47PM                < end of copied text >

## 2018-11-19 NOTE — ED ADULT NURSE NOTE - CAS TRG GEN SKIN COLOR
1. Have you been to the ER, urgent care clinic since your last visit? Hospitalized since your last visit? Yes When: 11/2018 Where: [facility] Reason for visit: Asthma 2. Have you seen or consulted any other health care providers outside of the [address] [place] [address] since your last visit? Include any pap smears or colon screening.  No 
 
 Normal for race

## 2019-03-06 ENCOUNTER — EMERGENCY (EMERGENCY)
Facility: HOSPITAL | Age: 24
LOS: 1 days | Discharge: HOME | End: 2019-03-06
Admitting: EMERGENCY MEDICINE
Payer: MEDICAID

## 2019-03-06 VITALS
TEMPERATURE: 98 F | OXYGEN SATURATION: 99 % | SYSTOLIC BLOOD PRESSURE: 110 MMHG | DIASTOLIC BLOOD PRESSURE: 71 MMHG | WEIGHT: 289.91 LBS | HEART RATE: 86 BPM | RESPIRATION RATE: 20 BRPM

## 2019-03-06 DIAGNOSIS — M79.645 PAIN IN LEFT FINGER(S): ICD-10-CM

## 2019-03-06 DIAGNOSIS — L03.012 CELLULITIS OF LEFT FINGER: ICD-10-CM

## 2019-03-06 DIAGNOSIS — Z79.899 OTHER LONG TERM (CURRENT) DRUG THERAPY: ICD-10-CM

## 2019-03-06 DIAGNOSIS — Z79.2 LONG TERM (CURRENT) USE OF ANTIBIOTICS: ICD-10-CM

## 2019-03-06 PROCEDURE — 10060 I&D ABSCESS SIMPLE/SINGLE: CPT

## 2019-03-06 PROCEDURE — 99283 EMERGENCY DEPT VISIT LOW MDM: CPT | Mod: 25

## 2019-03-06 RX ORDER — BACITRACIN ZINC 500 UNIT/G
1 OINTMENT IN PACKET (EA) TOPICAL
Qty: 1 | Refills: 0
Start: 2019-03-06 | End: 2019-03-12

## 2019-03-06 NOTE — ED PROVIDER NOTE - CARE PROVIDER_API CALL
Elle Blanco)  Orthopaedic Surgery  [address]  [place] [address]  Phone: [phone]  Fax: [phone]  Follow Up Time:

## 2019-03-06 NOTE — ED PROCEDURE NOTE - PROCEDURE ADDITIONAL DETAILS
bacitracin and soaked in warm water, wrapped in finger tubular dressing, wound care instructions provided

## 2019-03-06 NOTE — ED PROVIDER NOTE - OBJECTIVE STATEMENT
24 yo F with PMHx of UTI, trichomoniasis, G 22 yo F with PMHx of UTI, trichomoniasis, , currently 32 wks gestation with CELESTINE 19, LHD, presenting c/o L thumb pain and swelling x 3d.  Denies fever, chills, FB sensation, change in ROM/sensation, redness, swelling, paresthesia, purulent d/c, N/V, HA, dizziness, LOC, CP, SOB, and focal weakness 24 yo F with PMHx of UTI, trichomoniasis, , currently 32 wks gestation with CELESTINE 19, LHD, presenting c/o L thumb pain and swelling x 3d. Pt woke up with progressive worsening redness and swelling to the lateral aspect of the L thumb pad region. Hasn't been taking any pain meds, but noted worsening throbbing sensation to the tip today. Denies fever, chills, FB sensation, change in ROM/sensation, trauma, paresthesia, purulent d/c, insect bite, pruritus, N/V, HA, dizziness, LOC, CP, SOB, and focal weakness

## 2019-03-06 NOTE — ED PROVIDER NOTE - PHYSICAL EXAMINATION
Gen - WDWN F, NAD, comfortable and non-toxic appearing  Skin - warm, dry, +yellowish discoloration with fluctuance to the lateral L distal thumb nailbed, no active bleeding or d/c, no FB or crepitus   HEENT - AT/NC, airway patent, neck supple   CV - S1S2, R/R/R  Resp - CTAB, no r/r/w  GI - soft, gravid, NT, no CVAT b/l   MS - w/w/p, +L thumb paronychia, NV intact, FROM, +SILT   Neuro - AxOx3, ambulatory without gait disturbance

## 2019-03-06 NOTE — ED ADULT NURSE NOTE - NSIMPLEMENTINTERV_GEN_ALL_ED
Implemented All Universal Safety Interventions:  [place] to call system. Call bell, personal items and telephone within reach. Instruct patient to call for assistance. Room bathroom lighting operational. Non-slip footwear when patient is off stretcher. Physically safe environment: no spills, clutter or unnecessary equipment. Stretcher in lowest position, wheels locked, appropriate side rails in place.

## 2019-03-06 NOTE — ED PROVIDER NOTE - CLINICAL SUMMARY MEDICAL DECISION MAKING FREE TEXT BOX
pt with fluctuant tender paronychia on exam, s/p I&D at bedside, multiple loculations bluntly dissected, milked, and expressed, soaked in warm water, adequate hemostasis achieved s/p I&D, wound thoroughly irrigated with NS, wound c&s obtained and sent, instructed to return in 2d for wound check or sooner for any s/s of worsening infxn. strict return precautions discussed, pt verbalized understanding. pt with fluctuant tender paronychia on exam, s/p I&D at bedside, multiple loculations bluntly dissected, milked, and expressed, soaked in warm water, adequate hemostasis achieved s/p I&D, wound thoroughly irrigated with NS, wound c&s obtained and sent, course of duricef, instructed to return in 2d for wound check or sooner for any s/s of worsening infxn. strict return precautions discussed, pt verbalized understanding.

## 2019-03-12 LAB
CULTURE RESULTS: SIGNIFICANT CHANGE UP
SPECIMEN SOURCE: SIGNIFICANT CHANGE UP

## 2019-10-02 ENCOUNTER — EMERGENCY (EMERGENCY)
Facility: HOSPITAL | Age: 24
LOS: 1 days | Discharge: HOME | End: 2019-10-02
Admitting: EMERGENCY MEDICINE
Payer: MEDICAID

## 2019-10-02 VITALS
WEIGHT: 293 LBS | SYSTOLIC BLOOD PRESSURE: 108 MMHG | OXYGEN SATURATION: 99 % | HEART RATE: 85 BPM | RESPIRATION RATE: 18 BRPM | TEMPERATURE: 98 F | DIASTOLIC BLOOD PRESSURE: 74 MMHG
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
## 2019-10-02 DIAGNOSIS — Z23 ENCOUNTER FOR IMMUNIZATION: ICD-10-CM

## 2019-10-02 DIAGNOSIS — M79.645 PAIN IN LEFT FINGER(S): ICD-10-CM
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
## 2019-10-02 DIAGNOSIS — S80.212A ABRASION, LEFT KNEE, INITIAL ENCOUNTER: ICD-10-CM

## 2019-10-02 DIAGNOSIS — M79.642 PAIN IN LEFT HAND: ICD-10-CM
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
## 2019-10-02 PROCEDURE — 73130 X-RAY EXAM OF HAND: CPT | Mod: 26,LT

## 2019-10-02 PROCEDURE — 99283 EMERGENCY DEPT VISIT LOW MDM: CPT | Mod: 25

## 2019-10-02 RX ORDER — IBUPROFEN 200 MG
600 TABLET ORAL ONCE
Refills: 0 | Status: COMPLETED | OUTPATIENT
Start: 2019-10-02 | End: 2019-10-02

## 2019-10-02 RX ORDER — TETANUS TOXOID, REDUCED DIPHTHERIA TOXOID AND ACELLULAR PERTUSSIS VACCINE, ADSORBED 5; 2.5; 8; 8; 2.5 [IU]/.5ML; [IU]/.5ML; UG/.5ML; UG/.5ML; UG/.5ML
0.5 SUSPENSION INTRAMUSCULAR ONCE
Refills: 0 | Status: COMPLETED | OUTPATIENT
Start: 2019-10-02 | End: 2019-10-02

## 2019-10-02 RX ADMIN — TETANUS TOXOID, REDUCED DIPHTHERIA TOXOID AND ACELLULAR PERTUSSIS VACCINE, ADSORBED 0.5 MILLILITER(S): 5; 2.5; 8; 8; 2.5 SUSPENSION INTRAMUSCULAR at 13:43

## 2019-10-02 RX ADMIN — Medication 600 MILLIGRAM(S): at 12:08

## 2019-10-02 NOTE — ED PROVIDER NOTE - PMH
Anemia    IUD (intrauterine device) in place  IUD was removed in July 2018  Obesity    Trichomoniasis    UTI (urinary tract infection)

## 2019-10-02 NOTE — ED PROVIDER NOTE - PATIENT PORTAL LINK FT
R hip pain from a fall down 8 stairs, no LOC, L ear pain onset yesterday
You can access the FollowMyHealth Patient Portal offered by [facility] by registering at the following website: [email or web address]. By joining [facility]’s FollowMyHealth portal, you will also be able to view your health information using other applications (apps) compatible with our system.

## 2019-10-02 NOTE — ED PROVIDER NOTE - CLINICAL SUMMARY MEDICAL DECISION MAKING FREE TEXT BOX
s/p mechanical fall with left hand pain, nvi, xrays prelim no fx, finger splint, supportive care, f/u hand for persistent symptoms  left knee abrasions, wound care, nontender ,nvi, ambulatory

## 2019-10-02 NOTE — ED PROVIDER NOTE - NSFOLLOWUPINSTRUCTIONS_ED_ALL_ED_FT
Take Ibuprofen 600mg every 6-8 hours as needed for pain, take with food, and in addition you may take Tylenol 500 mg every 6-8 hours as needed for pain  Rest. Cool compresses.  Extremity elevation.  Ace wrap   Finger splint as needed    Follow up with hand specialist for persistent pain    KEep wounds clean and dry    RETURN TO THE EMERGENCY DEPARTMENT FOR SEVERE PAIN, PUS, FEVER, OR ANY CONCERNING OR WORSENING SYMPTOMS

## 2019-10-02 NOTE — ED PROVIDER NOTE - DIAGNOSTIC INTERPRETATION
Interpreted by LOUISA Canseco  left hand xrays 3 views  No fracture, no dislocation (joint spaces grossly normal), no Foreign Body noted, soft tissue normal

## 2019-10-02 NOTE — ED PROVIDER NOTE - OBJECTIVE STATEMENT
22 y/o female presents to the ED after a mechanical fall on an escalator yesterday. No LOC. She reports that she landed on her left hand and left knee. She felt pain in her left 4th digit today and notes it hurts to extend the finger. She has abrasions on her left knee but is not concerned due to absence of pain. She is here for further evaluation and x-rays of her left finger. Pt has a latex allergy. Denies dizziness, headache, syncope, n/v,  LOC, numbness, or tingling. 22 y/o female presents to the ED after a trip and fall on an escalator yesterday, fall onto outstretched left hand and left knee. c/o left 4th digit pain, painful to extend 4th digit. +abrasion to left knee, superficial, denies knee pain, ambulatory without difficulty.  No numbness, weakness or tingling  No LOC.  No head injury. No other injuries.

## 2019-10-02 NOTE — ED PROVIDER NOTE - PHYSICAL EXAMINATION
CONSTITUTIONAL: Well-developed; well-nourished; in no acute distress.  	SKIN: Skin is warm and dry, no acute rash.  	HEAD: Normocephalic; atraumatic.  	EYES: clear bilaterally  	ENT:  airway patent  LUE: normal inspection, skin intact, minimal tenderness to dorsal aspect of proximal 4th digit, medial/ulnar/radial nerve intact, tendon function intact actively and against resistance. good cap refill. no sensory or motor deficits. radial pulse 2+, rest of hand nontender, no wrist tenderness. soft compartments.   LLE: superficial abrasions to anterior left knee, no neuro/motor deficits, distal pulses intact, full ROM joints, nontender, soft compartments, ambulatory  	NEURO: Alert, oriented. Grossly unremarkable.  PSYCH: Cooperative, appropriate.

## 2019-10-02 NOTE — ED PROVIDER NOTE - CARE PROVIDER_API CALL
Pierce Blunt)  Plastic Surgery; Surgery  [address] [place] [address]  Phone: [phone]  Fax: [phone]  Follow Up Time:

## 2020-01-15 NOTE — ED ADULT NURSE NOTE - NSFALLRSKPASTHIST_ED_ALL_ED
"Occupational Therapy   Evaluation    Name: Brock Vanegas  MRN: [id number]  Admitting Diagnosis:  CHF (congestive heart failure) 1 Day Post-Op    Recommendations:     Discharge Recommendations: home  Discharge Equipment Recommendations:   to be reassessed closer to d/c  Barriers to discharge:  None    Assessment:     Brock Vanegas is a 14 y.o. male with a medical diagnosis of CHF (congestive heart failure).  He presents with impairments with functional mobility and ADLs, limited by weakness, line management and behavior. Pt requires multiple prompts/redirection to remain on task, responding playfully throughout. Pt able to ambulate ~220ft with CGA and handheld assist x2. Performance deficits affecting function: impaired functional mobilty, impaired balance, impaired self care skills, impaired endurance, decreased safety awareness, impaired coordination, weakness.      Rehab Prognosis: Good; patient would benefit from acute skilled OT services to address these deficits and reach maximum level of function.       Plan:     Patient to be seen 4 x/week to address the above listed problems via self-care/home management, therapeutic exercises, therapeutic activities  · Plan of Care Expires: 02/15/20  · Plan of Care Reviewed with: patient, mother    Subjective     "I got you!" Pt exhibits playful/joking behavior throughout session despite weakness/functional status    Chief Complaint: Decreased independence with mobility and self-care skills per mother's report   Patient/Family Comments/goals: return to PLOF    Occupational Profile:  Living Environment: Apartment with mother   Previous level of function: Typically independent with ADLs, but mother reports she aids when necessary/time constraints  Roles and Routines: Student, son  Equipment Used at Home:   trach/vent, trach supplies  Assistance upon Discharge: Pt will require continued assistance from mother at home as needed to maintain his functional status. "     Pain/Comfort:  · Pain Rating 1: 0/10    Patients cultural, spiritual, Anabaptist conflicts given the current situation: no    Objective:     Communicated with: RN prior to session.  Patient found HOB elevated with arterial line, ventilator, tracheostomy, pulse ox (continuous), telemetry, PICC line, central line, oxygen upon OT entry to room. Mother present at bedside, assisting pt with feeding. Mother consented to upcoming therapy evaluation and assisted with gathering information on pt's PLOF.     General Precautions: Standard, fall     Occupational Performance:    Bed Mobility:    · Patient completed Rolling/Turning to Right with stand by assistance    Functional Mobility/Transfers:  · Patient completed Sit <> Stand Transfer with contact guard assistance  with  hand-held assist   · Functional Mobility: Pt able to ambulate ~220 ft while CGA and hand-held assist throughout to maintain safety.     Activities of Daily Living:  · Feeding:  maximal assistance -- Pt may be limited by R wrist arterial line and/or weakness at this time   · Grooming: minimum assistance -- not observed but Min A due to functional mobility performance, line management, and safety concerns in standing   · Bathing: maximal assistance -- not observed but Min A due to functional mobility performance and safety concerns   · Upper Body Dressing: maximal assistance during EOB dressing of gown  · Lower Body Dressing: maximal assistance -- pt demonstrates functionally reach and grasp on socks. Despite multiple cues/attempts, behavior limited pt from completing LBD task  · Toileting: dependence -- pt diapered at this time pt would be able to walk to toilet with min A- limited by lines at this time. Would required total assist for hygiene and max A for clothing management    Cognitive/Visual Perceptual:  · Pt alert and attentive throughout session, but demonstrated limited participation/focus due to playful behavior throughout session.   · Pt  required multiple cues and redirection to remain on task/maintain safety during task.   · Pt able to communicate needs when necessary during session.      Physical Exam:  · UE ROM: WFL   · Standing balance: fair    Treatment & Education:    · Pt seen for skilled OT intervention to evaluate functional mobility and ADL performance. Pt's weakness and behavior seem to be his primary limiting factors at this time as evidence by decreased involvement/playful/joking demeanor during ADLs despite multiple prompts for redirection/attention to task. Playful response provided throughout to build rapport/trust with pt and family.   · Pt and mother educated on OT's role in the acute care setting.   · See above for details on functional mobility and ADL performance during evaluation.   Education:    Patient left up in chair with all lines intact, call button in reach and Mother and sitter present.     GOALS:   Multidisciplinary Problems     Occupational Therapy Goals        Problem: Occupational Therapy Goal    Goal Priority Disciplines Outcome Interventions   Occupational Therapy Goal     OT, PT/OT     Description:  Goals to be met by: 1/25/2020     Patient will increase functional independence with ADLs by performing:      Feeding with Minimal Assistance.  UE Dressing with Stand-by Assistance.  LE Dressing with Stand-by Assistance.  Grooming while standing with Stand-by Assistance.  Toileting from toilet with Stand-by Assistance for hygiene and clothing management.   Bathing from  standing at sink with Minimal Assistance.                    History:     Past Medical History:   Diagnosis Date    ADHD (attention deficit hyperactivity disorder)     Autism spectrum disorder 06/2017    Per mother's report today, Brock was dx'd with autism via eval at [facility].    Bacterial skin infection 12/2013    Behavior problem in child 12/2016    Suspended from school for 2 days fall 2016 for 13 infractions at school for  "purposely not following teacher's directions or making disruptive noises. Has had additional infractions other days and has made D's and F's in conduct. 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 FISH analysis revealed "a deletion in the DiGeorge/velocardiofacial syndrome chromosome region" (22q.11.2 deletion)    Feeding problems     History of feeding problems (had PEG tube; then had feeding problems when started oral intake [had OT for that]).[    History of congenital heart disease     History of speech therapy     Has had extensive speech therapy     Impaired speech articulation     Laryngeal stenosis     initally thought to be paralysis but on DLB patient noted to have posterior stenosis with decreased abduction, good adduction.   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    TRACHEOSTOMY W/ MLB  12/03/2012       Time Tracking:     OT Date of Treatment: 01/15/20  OT Start Time: 1023  OT Stop Time: 1053  OT Total Time (min): 30 min    Billable Minutes:Evaluation 10  Self Care/Home Management 20    KAMALJIT Yoo  1/15/2020    " no

## 2020-08-21 NOTE — ED PROVIDER NOTE - CHPI ED SYMPTOMS NEG
"Patient to ED for follow up of a positive STD test.   He states:" Dr. Saldana called me and told me I needed a shot for this infection. When asked if he lived at home,he states," I was about to get  but that's not happening as this is on her."  We discussed treatment and no sex for the next 2 weeks. Also to practice safe sex.  "
no abdominal pain/no fever/no chills

## 2020-09-04 NOTE — ED ADULT NURSE NOTE - NS ED NOTE ABUSE SUSPICION NEGLECT YN
Diallo Mcdaniels is a 36 year old male presenting with care giver Za for follow up behavioral issues.   Concerns: discuss possible medication to increase weight.      Medications: medications verified and updated  Refills needed today?  NO  denies Latex allergy or sensitivity  Tobacco history: verified    Health Maintenance Due   Topic Date Due   • Varicella Vaccine (1 of 2 - 2-dose childhood series) 06/13/1986   • Depression Screening  06/13/1997   • Hepatitis B Vaccine (3 of 3 - 3-dose primary series) 08/31/2000   • Influenza Vaccine (1) 09/01/2020     Patient is due for topics as listed above but is not proceeding with Immunization(s) Hep B, Influenza and Varicella and Depression Screening  at this time.     Patient would like communication of their results via:        Cell Phone: Za caregivers cell  Telephone Information:   Mobile [phone]     Okay to leave a message containing results? Yes    PCP verified.     No

## 2020-10-18 NOTE — ED ADULT NURSE NOTE - NS ED NURSE RECORD ANOTHER VITAL SIGN
ANTONIO placed phone call to [facility] [phone] and spoke with Rep. Estephania Ojeda    Initiated referral for inpatient psych.      Luane Habermann, MSW, [place]  10/18/20 [id number] Yes

## 2020-12-08 ENCOUNTER — EMERGENCY (EMERGENCY)
Facility: HOSPITAL | Age: 25
LOS: 1 days | Discharge: HOME | End: 2020-12-08
Attending: EMERGENCY MEDICINE | Admitting: EMERGENCY MEDICINE
Payer: MEDICAID

## 2020-12-08 VITALS
DIASTOLIC BLOOD PRESSURE: 84 MMHG | OXYGEN SATURATION: 97 % | HEART RATE: 65 BPM | TEMPERATURE: 98 F | SYSTOLIC BLOOD PRESSURE: 128 MMHG | HEIGHT: 66 IN | RESPIRATION RATE: 17 BRPM

## 2020-12-08 DIAGNOSIS — Z20.828 CONTACT WITH AND (SUSPECTED) EXPOSURE TO OTHER VIRAL COMMUNICABLE DISEASES: ICD-10-CM

## 2020-12-08 DIAGNOSIS — Z91.013 ALLERGY TO SEAFOOD: ICD-10-CM

## 2020-12-08 DIAGNOSIS — Z91.040 LATEX ALLERGY STATUS: ICD-10-CM

## 2020-12-08 PROCEDURE — 99283 EMERGENCY DEPT VISIT LOW MDM: CPT

## 2020-12-08 NOTE — ED PROVIDER NOTE - PATIENT PORTAL LINK FT
You can access the FollowMyHealth Patient Portal offered by [facility] by registering at the following website: [email or web address]. By joining [facility]’s FollowMyHealth portal, you will also be able to view your health information using other applications (apps) compatible with our system.

## 2020-12-08 NOTE — ED PROVIDER NOTE - NSFOLLOWUPINSTRUCTIONS_ED_ALL_ED_FT
IF YOU DO NOT GET YOUR RESULTS WITHIN 48 HOURS PLEASE CALL [phone].    IF YOUR RESULT COMES BACK POSITIVE:    1. STAY HOME for 14 DAYS  2. Minimize Human contact to ONLY ESSENTIAL  3. Every time you wash your hands, sing the HAPPY BIRTHDAY Song so you know you're washing long enough.  Make sure to scrub the webspace between your fingers.  4. DRINK 1-3 Liters of fluids day x at least 5 days.  To remain hydrated. Your fatigue, lightheadedness, and body aches will decrease and your fever has a better chance of breaking if you are well hydrated.    5. For your Fever and Body aches takes Tylenol 650-100mg every 4-6h (max 4000mg/day). Try not to use ibuprofen, aspirin or naproxen (Advil, Motrin or Aleve) as these may worsen Coronavirus infection.  6. RETURN TO THE ER IMMEDIATELY IF YOU HAVE WORSENING SHORTNESS OF BREATH. SYMPTOMS USUALLY PEAK BETWEEN DAY 7-10.

## 2020-12-08 NOTE — ED PROVIDER NOTE - NO SIGNIFICANT PAST SURGICAL HISTORY
Detail Level: Zone
Include Location In Plan?: No
<<----- Click to add NO significant Past Surgical History

## 2021-02-23 NOTE — ED ADULT TRIAGE NOTE - NS ED NURSE DIRECT TO ROOM YN
February 23, 2021  Me         11:42 AM  Note     Called and talked to patient's Guardian Laura. She was wondering what was going on with his doxycycline. Let her know that it required a PA from his insurance and explained what that means.  Let her know the office had found out via fax from the pharmacy just yesterday for the first time that the prior auth being needed. Let her know the prior auth was started yesterday and the office will call her when there is a determination.           
Pt mom called stating that pharmacy ha not received prior authorization for recent prescription and they are unable to  the medication. Please advise.   
Yes

## 2021-08-19 ENCOUNTER — EMERGENCY (EMERGENCY)
Facility: HOSPITAL | Age: 26
LOS: 1 days | Discharge: HOME | End: 2021-08-19
Attending: EMERGENCY MEDICINE | Admitting: EMERGENCY MEDICINE
Payer: MEDICAID

## 2021-08-19 VITALS
DIASTOLIC BLOOD PRESSURE: 84 MMHG | OXYGEN SATURATION: 97 % | WEIGHT: 293 LBS | TEMPERATURE: 99 F | RESPIRATION RATE: 16 BRPM | HEIGHT: 66 IN | HEART RATE: 94 BPM | SYSTOLIC BLOOD PRESSURE: 129 MMHG

## 2021-08-19 DIAGNOSIS — E66.9 OBESITY, UNSPECIFIED: ICD-10-CM

## 2021-08-19 DIAGNOSIS — Z87.442 PERSONAL HISTORY OF URINARY CALCULI: ICD-10-CM

## 2021-08-19 DIAGNOSIS — Z3A.01 LESS THAN 8 WEEKS GESTATION OF PREGNANCY: ICD-10-CM

## 2021-08-19 DIAGNOSIS — Z86.2 PERSONAL HISTORY OF DISEASES OF THE BLOOD AND BLOOD-FORMING ORGANS AND CERTAIN DISORDERS INVOLVING THE IMMUNE MECHANISM: ICD-10-CM

## 2021-08-19 DIAGNOSIS — O21.0 MILD HYPEREMESIS GRAVIDARUM: ICD-10-CM

## 2021-08-19 DIAGNOSIS — Z91.013 ALLERGY TO SEAFOOD: ICD-10-CM

## 2021-08-19 DIAGNOSIS — O99.891 OTHER SPECIFIED DISEASES AND CONDITIONS COMPLICATING PREGNANCY: ICD-10-CM

## 2021-08-19 DIAGNOSIS — Z91.040 LATEX ALLERGY STATUS: ICD-10-CM

## 2021-08-19 LAB
ALBUMIN SERPL ELPH-MCNC: 3.5 G/DL — SIGNIFICANT CHANGE UP (ref 3.4–5)
ALP SERPL-CCNC: 71 U/L — SIGNIFICANT CHANGE UP (ref 40–120)
ALT FLD-CCNC: 22 U/L — SIGNIFICANT CHANGE UP (ref 12–42)
ANION GAP SERPL CALC-SCNC: 7 MMOL/L — LOW (ref 9–16)
AST SERPL-CCNC: 12 U/L — LOW (ref 15–37)
BILIRUB SERPL-MCNC: 0.3 MG/DL — SIGNIFICANT CHANGE UP (ref 0.2–1.2)
BUN SERPL-MCNC: 10 MG/DL — SIGNIFICANT CHANGE UP (ref 7–23)
CALCIUM SERPL-MCNC: 9.1 MG/DL — SIGNIFICANT CHANGE UP (ref 8.5–10.5)
CHLORIDE SERPL-SCNC: 103 MMOL/L — SIGNIFICANT CHANGE UP (ref 96–108)
CO2 SERPL-SCNC: 26 MMOL/L — SIGNIFICANT CHANGE UP (ref 22–31)
CREAT SERPL-MCNC: 0.8 MG/DL — SIGNIFICANT CHANGE UP (ref 0.5–1.3)
GLUCOSE SERPL-MCNC: 99 MG/DL — SIGNIFICANT CHANGE UP (ref 70–99)
HCG SERPL-ACNC: HIGH MIU/ML
HCT VFR BLD CALC: 39.9 % — SIGNIFICANT CHANGE UP (ref 34.5–45)
HGB BLD-MCNC: 12.6 G/DL — SIGNIFICANT CHANGE UP (ref 11.5–15.5)
MCHC RBC-ENTMCNC: 26.4 PG — LOW (ref 27–34)
MCHC RBC-ENTMCNC: 31.6 GM/DL — LOW (ref 32–36)
MCV RBC AUTO: 83.5 FL — SIGNIFICANT CHANGE UP (ref 80–100)
NRBC # BLD: 0 /100 WBCS — SIGNIFICANT CHANGE UP (ref 0–0)
PLATELET # BLD AUTO: 307 K/UL — SIGNIFICANT CHANGE UP (ref 150–400)
POTASSIUM SERPL-MCNC: 4 MMOL/L — SIGNIFICANT CHANGE UP (ref 3.5–5.3)
POTASSIUM SERPL-SCNC: 4 MMOL/L — SIGNIFICANT CHANGE UP (ref 3.5–5.3)
PROT SERPL-MCNC: 7.7 G/DL — SIGNIFICANT CHANGE UP (ref 6.4–8.2)
RBC # BLD: 4.78 M/UL — SIGNIFICANT CHANGE UP (ref 3.8–5.2)
RBC # FLD: 14.2 % — SIGNIFICANT CHANGE UP (ref 10.3–14.5)
SODIUM SERPL-SCNC: 136 MMOL/L — SIGNIFICANT CHANGE UP (ref 132–145)
WBC # BLD: 6.07 K/UL — SIGNIFICANT CHANGE UP (ref 3.8–10.5)
WBC # FLD AUTO: 6.07 K/UL — SIGNIFICANT CHANGE UP (ref 3.8–10.5)

## 2021-08-19 PROCEDURE — 76801 OB US < 14 WKS SINGLE FETUS: CPT | Mod: 26

## 2021-08-19 PROCEDURE — 99284 EMERGENCY DEPT VISIT MOD MDM: CPT

## 2021-08-19 RX ORDER — SODIUM CHLORIDE 9 MG/ML
1000 INJECTION, SOLUTION INTRAVENOUS
Refills: 0 | Status: DISCONTINUED | OUTPATIENT
Start: 2021-08-19 | End: 2021-08-23

## 2021-08-19 RX ORDER — ONDANSETRON 8 MG/1
4 TABLET, FILM COATED ORAL ONCE
Refills: 0 | Status: COMPLETED | OUTPATIENT
Start: 2021-08-19 | End: 2021-08-19

## 2021-08-19 RX ORDER — SODIUM CHLORIDE 9 MG/ML
1000 INJECTION, SOLUTION INTRAVENOUS
Refills: 0 | Status: DISCONTINUED | OUTPATIENT
Start: 2021-08-19 | End: 2021-08-19

## 2021-08-19 RX ADMIN — ONDANSETRON 4 MILLIGRAM(S): 8 TABLET, FILM COATED ORAL at 20:03

## 2021-08-19 RX ADMIN — SODIUM CHLORIDE 500 MILLILITER(S): 9 INJECTION, SOLUTION INTRAVENOUS at 20:03

## 2021-08-19 NOTE — ED PROVIDER NOTE - NSICDXPASTMEDICALHX_GEN_ALL_CORE_FT
PAST MEDICAL HISTORY:  Anemia     IUD (intrauterine device) in place IUD was removed in July 2018    Obesity     Trichomoniasis     UTI (urinary tract infection)

## 2021-08-19 NOTE — ED ADULT NURSE NOTE - NSICDXPASTSURGICALHX_GEN_ALL_CORE_FT
Alvaro Boyd MD        2/20/17 3:38 PM   Note      Can you see what proton pumps inhibitors are approved. Thanks                  2/20/17 3:26 PM   You routed this conversation to Alvaro Boyd MD    Me        2/20/17 3:23 PM   Note      [employer] denied PA for Pantoprazole 40 mg.              PAST SURGICAL HISTORY:  No significant past surgical history

## 2021-08-19 NOTE — ED PROVIDER NOTE - CLINICAL SUMMARY MEDICAL DECISION MAKING FREE TEXT BOX
cc:  nausea  , LMP 2021.  No vag bleed, no pelvic pain, no dysuria.   Pt has Sept 10 appt with Obgyn.  sono and labs ordered

## 2021-08-19 NOTE — ED ADULT NURSE NOTE - NSIMPLEMENTINTERV_GEN_ALL_ED
Implemented All Universal Safety Interventions:  [employer] to call system. Call bell, personal items and telephone within reach. Instruct patient to call for assistance. Room bathroom lighting operational. Non-slip footwear when patient is off stretcher. Physically safe environment: no spills, clutter or unnecessary equipment. Stretcher in lowest position, wheels locked, appropriate side rails in place.

## 2021-08-19 NOTE — ED PROVIDER NOTE - OBJECTIVE STATEMENT
cc:  nausea  , LMP 2021.  No vag bleed, no pelvic pain, no dysuria. cc:  nausea  , LMP 2021.  No vag bleed, no pelvic pain, no dysuria.  Pt has Sept 10 appt with Obgyn.

## 2021-08-19 NOTE — ED ADULT TRIAGE NOTE - ACCOMPANIED BY
Self
83yMale with 3y M, s/p robot assisted [place]-T[place] 8/14, admitted from rehab with anastomotic leak, respiratory distress 2/2 pleural effusions s/p L pigtail placement. Persistent leak noted on repeat esophagram 9/19- intubated.

## 2021-08-19 NOTE — ED ADULT TRIAGE NOTE - WEIGHT IN LBS
[address]: Preventive Care And Screening: Tobacco Use: Screening And Cessation Intervention: Patient screened for tobacco use and is an ex/non-smoker Quality 110: Preventive Care And Screening: Influenza Immunization: Influenza Immunization Administered during Influenza season Detail Level: Zone

## 2021-08-19 NOTE — ED ADULT NURSE NOTE - CAS EDN DISCHARGE ASSESSMENT
Pt. states she's walking home leaves 3 blocks away./Alert and oriented to person, place and time/Awake

## 2021-08-19 NOTE — ED PROVIDER NOTE - TEMPLATE
History  Chief Complaint   Patient presents with    Shortness of Breath     pt arrives with c/o pain, fatigue, difficulty breathing, coughing up phlegm via    ? fevers  pt with recent hospitalization for COVID      63year old M, returning for c/o of SOB  Recent diagnosis and admission for COVID  D/c Home 3 days ago with no oxygen needs  Pregoressively feeling wrose  Patient tachypnic and speaking in several word sentences  Difficult to obtain full history from patient, Medical records reviewed  History provided by:  Patient and medical records   used: Yes    Shortness of Breath  Severity:  Moderate  Onset quality:  Gradual  Duration:  7 days  Timing:  Constant  Progression:  Worsening  Chronicity:  New  Context comment:  COVID+  Relieved by:  Nothing  Worsened by:  Exertion  Ineffective treatments:  None tried  Associated symptoms: cough, fever and sputum production    Associated symptoms: no abdominal pain, no chest pain, no ear pain, no headaches, no hemoptysis, no neck pain, no rash, no sore throat, no vomiting and no wheezing        Prior to Admission Medications   Prescriptions Last Dose Informant Patient Reported? Taking?    ARIPiprazole (ABILIFY) 10 mg tablet 3/12/2021 at Unknown time Self Yes Yes   Sig: daily at bedtime    DULoxetine (CYMBALTA) 60 mg delayed release capsule 3/13/2021 at Unknown time Self Yes Yes   Sig: Take 60 mg by mouth 2 (two) times a day    Multiple Vitamins-Minerals (MULTIVITAMIN ADULT PO) 3/13/2021 at Unknown time Self Yes Yes   Sig: take 2 tablets by oral route daily   acetaminophen (TYLENOL) 325 mg tablet 3/13/2021 at Unknown time  No Yes   Sig: Take 2 tablets (650 mg total) by mouth every 6 (six) hours as needed for mild pain   aluminum-magnesium hydroxide-simethicone (MYLANTA) 200-200-20 mg/5 mL suspension Not Taking at Unknown time  No No   Sig: Take 30 mL by mouth every 6 (six) hours as needed for indigestion or heartburn   Patient not taking:  Symptoms Reported on 3/13/2021   ascorbic acid (VITAMIN C) 1000 MG tablet 3/13/2021 at Unknown time  No Yes   Sig: Take 1 tablet (1,000 mg total) by mouth every 12 (twelve) hours for 12 doses   Patient not taking: Reported on 3/13/2021   benzonatate (TESSALON) 200 MG capsule 3/13/2021 at Unknown time  No Yes   Sig: Take 1 capsule (200 mg total) by mouth 3 (three) times a day as needed for cough   calcium citrate (CALCITRATE) 950 MG tablet Not Taking at Unknown time Self Yes No   Sig: Take 1 tablet by mouth daily   cholecalciferol (VITAMIN D3) 1,000 units tablet Not Taking at Unknown time  No No   Sig: Take 2 tablets (2,000 Units total) by mouth daily   Patient not taking: Reported on 3/13/2021   dextromethorphan-guaiFENesin (ROBITUSSIN DM)  mg/5 mL syrup Not Taking at Unknown time  No No   Sig: Take 10 mL by mouth every 4 (four) hours as needed for cough   Patient not taking: Reported on 3/13/2021   hydroxychloroquine (PLAQUENIL) 200 mg tablet 3/13/2021 at Unknown time  No Yes   Sig: Take 1 tablet (200 mg total) by mouth daily   predniSONE 20 mg tablet 3/13/2021 at Unknown time  No Yes   Sig: Take 1 tablet (20 mg total) by mouth daily   traZODone (DESYREL) 100 mg tablet 3/12/2021 at Unknown time Self Yes Yes   Sig: take 2 tablet by oral route  every night   zinc sulfate (ZINCATE) 220 mg capsule Not Taking at Unknown time  No No   Sig: Take 1 capsule (220 mg total) by mouth daily for 6 doses   Patient not taking: Reported on 3/13/2021      Facility-Administered Medications: None       Past Medical History:   Diagnosis Date    Anemia     b12    Arthritis     Back pain     Depression     GERD (gastroesophageal reflux disease)     Headache     Herniated cervical disc     Herniated lumbar intervertebral disc     Malabsorption syndrome     post gastric bypass    Obesity     Vitamin B12 deficiency 2014    Vitamin D deficiency 2014    Wears glasses        Past Surgical History:   Procedure Laterality Date    CARPAL TUNNEL RELEASE Bilateral     CHOLECYSTECTOMY      COLONOSCOPY  2014    normal exam    EGD  12/2019    pouchitis    ESOPHAGOGASTRODUODENOSCOPY  2015    biopsy taken,     GASTRIC BYPASS  2010    HEMORROIDECTOMY  06/2016    MN LAP,CHOLECYSTECTOMY/GRAPH N/A 4/26/2019    Procedure: LAPAROSCOPIC CHOLECYSTECTOMY;  Surgeon: Hitesh Alvarado MD;  Location: [address] [place] [address];  Service: General    VASECTOMY         Family History   Problem Relation Age of Onset    Asthma Mother     Hypertension Mother     Diabetes Mother         MELLITUS    Coronary artery disease Mother     Lung cancer Mother         COD    Diabetes Father         MELLITUS    Hypertension Father     Cancer Sister     Cancer Brother      I have reviewed and agree with the history as documented  E-Cigarette/Vaping    E-Cigarette Use Never User      E-Cigarette/Vaping Substances     Social History     Tobacco Use    Smoking status: Never Smoker    Smokeless tobacco: Never Used    Tobacco comment: NO TOBACCO USE   Substance Use Topics    Alcohol use: Not Currently     Alcohol/week: 3 0 standard drinks     Types: 3 Cans of beer per week     Frequency: Monthly or less     Drinks per session: 1 or 2     Binge frequency: Never     Comment: "socially"    Drug use: Not Currently       Review of Systems   Constitutional: Positive for fever  Negative for chills  HENT: Negative  Negative for ear pain, rhinorrhea, sore throat, trouble swallowing and voice change  Eyes: Negative  Negative for pain and visual disturbance  Respiratory: Positive for cough, sputum production and shortness of breath  Negative for hemoptysis and wheezing  Cardiovascular: Negative  Negative for chest pain and palpitations  Gastrointestinal: Negative for abdominal pain, diarrhea, nausea and vomiting  Genitourinary: Negative  Negative for dysuria, frequency and hematuria  Musculoskeletal: Negative    Negative for arthralgias, back pain, neck pain and neck stiffness  Skin: Negative  Negative for color change and rash  Neurological: Negative  Negative for dizziness, seizures, syncope, speech difficulty, weakness, light-headedness, numbness and headaches  All other systems reviewed and are negative  Physical Exam  Physical Exam  Vitals signs and nursing note reviewed  Constitutional:       General: He is not in acute distress  Appearance: He is well-developed  HENT:      Head: Normocephalic and atraumatic  Eyes:      Conjunctiva/sclera: Conjunctivae normal       Pupils: Pupils are equal, round, and reactive to light  Neck:      Musculoskeletal: Normal range of motion and neck supple  Trachea: No tracheal deviation  Cardiovascular:      Rate and Rhythm: Regular rhythm  Tachycardia present  Pulmonary:      Effort: Tachypnea and accessory muscle usage present  Breath sounds: No stridor  Decreased breath sounds present  No wheezing or rales  Abdominal:      General: Bowel sounds are normal  There is no distension  Palpations: Abdomen is soft  Tenderness: There is no abdominal tenderness  There is no guarding or rebound  Musculoskeletal: Normal range of motion  General: No tenderness or deformity  Skin:     General: Skin is warm and dry  Capillary Refill: Capillary refill takes less than 2 seconds  Findings: No rash  Neurological:      Mental Status: He is alert and oriented to person, place, and time     Psychiatric:         Behavior: Behavior normal          Vital Signs  ED Triage Vitals   Temperature Pulse Respirations Blood Pressure SpO2   03/13/21 1204 03/13/21 1204 03/13/21 1204 03/13/21 1204 03/13/21 1204   100 1 °F (37 8 °C) 104 (!) 24 119/94 (!) 76 %      Temp Source Heart Rate Source Patient Position - Orthostatic VS BP Location FiO2 (%)   03/13/21 1204 03/13/21 1204 03/13/21 1204 03/13/21 1204 03/13/21 1335   Tympanic Monitor Sitting Left arm 91      Pain Score       03/13/21 1243       Med Not Given for Pain - for MAR use only           Vitals:    03/13/21 1500 03/13/21 1515 03/13/21 1530 03/13/21 1607   BP: 108/67  95/54 100/62   Pulse: 70 72 66 83   Patient Position - Orthostatic VS: Lying   Lying         Visual Acuity      ED Medications  Medications   ibuprofen (MOTRIN) tablet 600 mg (600 mg Oral Given 3/13/21 1243)   iohexol (OMNIPAQUE) 350 MG/ML injection (MULTI-DOSE) 100 mL (100 mL Intravenous Given 3/13/21 1558)       Diagnostic Studies  Results Reviewed     Procedure Component Value Units Date/Time    Blood culture #1 [[id number]] Collected: 03/13/21 1232    Lab Status: Preliminary result Specimen: Blood from Hand, Right Updated: 03/14/21 0301     Blood Culture Received in Microbiology Lab  Culture in Progress  Blood culture #2 [[id number]] Collected: 03/13/21 1221    Lab Status: Preliminary result Specimen: Blood from Arm, Left Updated: 03/14/21 0301     Blood Culture Received in Microbiology Lab  Culture in Progress      Procalcitonin with AM Reflex [[id number]]  (Normal) Collected: 03/13/21 1221    Lab Status: Final result Specimen: Blood from Arm, Left Updated: 03/13/21 2338     Procalcitonin 0 16 ng/ml     Procalcitonin Reflex [[id number]]     Lab Status: No result Specimen: Blood     Manual Differential (Non Wam) [[id number]]  (Abnormal) Collected: 03/13/21 1221    Lab Status: Final result Specimen: Blood from Arm, Left Updated: 03/13/21 1310     Segmented % 88 %      Bands % 3 %      Lymphocytes % 6 %      Monocytes % 2 %      Atypical Lymphocytes % 1 %      Neutrophils Absolute 10 28 Thousand/uL      Lymphocytes Absolute 0 68 Thousand/uL      Monocytes Absolute 0 23 Thousand/uL      Total Counted 100     RBC Morphology Normal     Platelet Estimate Adequate    Lactic acid [[id number]]  (Normal) Collected: 03/13/21 1221    Lab Status: Final result Specimen: Blood from Arm, Left Updated: 03/13/21 1302     LACTIC ACID 1 5 mmol/L     Narrative:      Result may be elevated if tourniquet was used during collection      Troponin I [[id number]]  (Normal) Collected: 03/13/21 1221    Lab Status: Final result Specimen: Blood from Arm, Left Updated: 03/13/21 1257     Troponin I <0 01 ng/mL     D-dimer, quantitative [[id number]]  (Abnormal) Collected: 03/13/21 1221    Lab Status: Final result Specimen: Blood from Arm, Left Updated: 03/13/21 1251     D-Dimer, Quant 1 04 ug/ml FEU     Comprehensive metabolic panel [[id number]]  (Abnormal) Collected: 03/13/21 1221    Lab Status: Final result Specimen: Blood from Arm, Left Updated: 03/13/21 1249     Sodium 138 mmol/L      Potassium 3 5 mmol/L      Chloride 102 mmol/L      CO2 27 mmol/L      ANION GAP 9 mmol/L      BUN 12 mg/dL      Creatinine 1 06 mg/dL      Glucose 149 mg/dL      Calcium 8 8 mg/dL      AST 53 U/L      ALT 37 U/L      Alkaline Phosphatase 76 U/L      Total Protein 7 7 g/dL      Albumin 3 8 g/dL      Total Bilirubin 0 70 mg/dL      eGFR 78 ml/min/1 73sq m     Narrative:      [facility] guidelines for Chronic Kidney Disease (CKD):     Stage 1 with normal or high GFR (GFR > 90 mL/min/1 73 square meters)    Stage 2 Mild CKD (GFR = 60-89 mL/min/1 73 square meters)    Stage 3A Moderate CKD (GFR = 45-59 mL/min/1 73 square meters)    Stage 3B Moderate CKD (GFR = 30-44 mL/min/1 73 square meters)    Stage 4 Severe CKD (GFR = 15-29 mL/min/1 73 square meters)    Stage 5 End Stage CKD (GFR <15 mL/min/1 73 square meters)  Note: GFR calculation is accurate only with a steady state creatinine    Protime-INR [[id number]]  (Normal) Collected: 03/13/21 1221    Lab Status: Final result Specimen: Blood from Arm, Left Updated: 03/13/21 1246     Protime 14 1 seconds      INR 1 08    APTT [[id number]]  (Normal) Collected: 03/13/21 1221    Lab Status: Final result Specimen: Blood from Arm, Left Updated: 03/13/21 1246     PTT 29 seconds     CBC and differential [[id number]]  (Abnormal) Collected: 03/13/21 1221    Lab Status: Final result Specimen: Blood from Arm, Left Updated: 03/13/21 1243     WBC 11 30 Thousand/uL      RBC 4 67 Million/uL      Hemoglobin 13 5 g/dL      Hematocrit 41 4 %      MCV 89 fL      MCH 29 0 pg      MCHC 32 7 g/dL      RDW 13 3 %      MPV 10 5 fL      Platelets 141 Thousands/uL     Sputum culture and Gram stain [[id number]]     Lab Status: No result Specimen: Sputum                  CTA ED chest PE Study   Final Result by J Luis Copeland MD (03/13 1617)   1  Extensive groundglass and patchy pulmonary opacities in a peripheral and basilar distribution consistent with provided clinical history of Covid 19    2  No PE  Workstation performed: [id number]      XR chest 1 view portable   ED Interpretation by Ramya Keller DO (03/13 1243)   Worsening b/l peripheral opacities c/w COVID infection  Procedures  Procedures         ED Course  ED Course as of Mar 14 1024   Sat Mar 13, 2021   1230 Procedure Note: EKG  Date/Time: 03/13/21 12:30 PM   Performed by: Bradly Denson  Authorized by: Bradly Denson  ECG interpreted by me, the ED Provider: yes   The EKG demonstrates:  Rate 92  Rhythm sinus  QTc 457  No ST elevations/depressions          1502 Patient accepted by Dr Gilberto Tello at [facility]  CTA requested for elevated d-dimer  Reviewed recent guidelines from critical care due reduce CT scans for COVID patients  Accepting physician feels that the acute decompensation requires CT scan and I have ordered one at his request  Care trasnferred to DR [facility]  SBIRT 22yo+      Most Recent Value   SBIRT (22 yo +)   In order to provide better care to our patients, we are screening all of our patients for alcohol and drug use  Would it be okay to ask you these screening questions? Yes Filed at: 03/13/2021 1236   Initial Alcohol Screen: US AUDIT-C    1  How often do you have a drink containing alcohol?  0 Filed at: 03/13/2021 1236   2   How many drinks containing alcohol do you have on a typical day you are drinking? 0 Filed at: 03/13/2021 1236   3a  Male UNDER 65: How often do you have five or more drinks on one occasion? 0 Filed at: 03/13/2021 1236   3b  FEMALE Any Age, or MALE 65+: How often do you have 4 or more drinks on one occassion? 0 Filed at: 03/13/2021 1236   Audit-C Score  0 Filed at: 03/13/2021 1236   TULIO: How many times in the past year have you    Used an illegal drug or used a prescription medication for non-medical reasons? Never Filed at: 03/13/2021 1236                    MDM  Number of Diagnoses or Management Options  COVID-19:   Hypoxia:   Diagnosis management comments: 81-year-old male who initially presented for concerns for worsening coronavirus infection  Found to be hypoxic with significant work of breathing  Attempted to do nasal cannula non-rebreather which were ineffective  Patient then placed on Vapotherm and groaned and his vital signs improved  Chest x-ray shows worsening COVID infection with worsening bilateral peripheral opacities per my interpretation  Patient improved where he could speak in full sentences well on Vapotherm  Unable to continue Vapotherm services this hospital, patient will be transferred for high-level of care and management  Patient agreeable with plan no further questions or concerns regarding his care in the emergency room         Amount and/or Complexity of Data Reviewed  Clinical lab tests: ordered and reviewed  Tests in the radiology section of CPT®: ordered and reviewed  Tests in the medicine section of CPT®: ordered and reviewed  Independent visualization of images, tracings, or specimens: yes        Disposition  Final diagnoses:   COVID-19   Hypoxia     Time reflects when diagnosis was documented in both MDM as applicable and the Disposition within this note     Time User Action Codes Description Comment    3/13/2021  3:03 PM Thais Harlem Add [U07 1] COVID-19     3/13/2021  3:03 PM Thais Harlem Add [R09 02] Hypoxia       ED Disposition     ED Disposition Condition Date/Time Comment    Transfer to Another Facility-In Network  [facility] Mar 13, 2021  3:03 PM Diane Matson should be transferred out to [facility]        MD Documentation      Most Recent Value   Patient Condition  The patient has been stabilized such that within reasonable medical probability, no material deterioration of the patient condition or the condition of the unborn child(tay) is likely to result from the transfer   Reason for Transfer  Level of Care needed not available at this facility   Benefits of Transfer  Specialized equipment and/or services available at the receiving facility (Include comment)________________________   Risks of Transfer  Potential for delay in receiving treatment, Loss of IV, Potential deterioration of medical condition, Increased discomfort during transfer, Possible worsening of condition or death during transfer   Accepting Physician  Dr Juliano Bell Name, Emma Garcia   Sending MD Dr Jessica Cao   Provider Certification  General risk, such as traffic hazards, adverse weather conditions, rough terrain or turbulence, possible failure of equipment (including vehicle or aircraft), or consequences of actions of persons outside the control of the transport personnel      RN Documentation      [facility] [address] [facility] Name, Emma Garcia      Follow-up Information    None         Discharge Medication List as of 3/13/2021  4:15 PM      CONTINUE these medications which have NOT CHANGED    Details   acetaminophen (TYLENOL) 325 mg tablet Take 2 tablets (650 mg total) by mouth every 6 (six) hours as needed for mild pain, Starting u 3/11/2021, Normal      aluminum-magnesium hydroxide-simethicone (MYLANTA) 200-200-20 mg/5 mL suspension Take 30 mL by mouth every 6 (six) hours as needed for indigestion or heartburn, Starting u 3/11/2021, Normal      ARIPiprazole (ABILIFY) 10 mg tablet daily at bedtime , Starting Thu 6/25/2020, Historical Med      ascorbic acid (VITAMIN C) 1000 MG tablet Take 1 tablet (1,000 mg total) by mouth every 12 (twelve) hours for 12 doses, Starting Thu 3/11/2021, Until Wed 3/17/2021, Normal      benzonatate (TESSALON) 200 MG capsule Take 1 capsule (200 mg total) by mouth 3 (three) times a day as needed for cough, Starting Thu 3/11/2021, Normal      calcium citrate (CALCITRATE) 950 MG tablet Take 1 tablet by mouth daily, Historical Med      cholecalciferol (VITAMIN D3) 1,000 units tablet Take 2 tablets (2,000 Units total) by mouth daily, Starting Fri 3/12/2021, Normal      dextromethorphan-guaiFENesin (ROBITUSSIN DM)  mg/5 mL syrup Take 10 mL by mouth every 4 (four) hours as needed for cough, Starting Thu 3/11/2021, Normal      DULoxetine (CYMBALTA) 60 mg delayed release capsule Take 20 mg by mouth daily, Historical Med      hydroxychloroquine (PLAQUENIL) 200 mg tablet Take 1 tablet (200 mg total) by mouth daily, Starting Thu 3/4/2021, Until Sat 4/3/2021, Normal      Multiple Vitamins-Minerals (MULTIVITAMIN ADULT PO) take 2 tablets by oral route daily, Historical Med      predniSONE 20 mg tablet Take 1 tablet (20 mg total) by mouth daily, Starting Thu 3/4/2021, Normal      traZODone (DESYREL) 100 mg tablet take 2 tablet by oral route  every night, Historical Med      zinc sulfate (ZINCATE) 220 mg capsule Take 1 capsule (220 mg total) by mouth daily for 6 doses, Starting Fri 3/12/2021, Until Thu 3/18/2021, Normal           No discharge procedures on file      PDMP Review       Value Time User    PDMP Reviewed  Yes 12/3/2020 11:50 AM Ronak Lara MD          ED Provider  Electronically Signed by           Clifford Delcid DO  03/14/21 1024

## 2021-08-19 NOTE — ED PROVIDER NOTE - PATIENT PORTAL LINK FT
You can access the FollowMyHealth Patient Portal offered by [facility] by registering at the following website: [email or web address]. By joining [facility]’s FollowMyHealth portal, you will also be able to view your health information using other applications (apps) compatible with our system.

## 2021-08-20 VITALS
RESPIRATION RATE: 16 BRPM | OXYGEN SATURATION: 97 % | SYSTOLIC BLOOD PRESSURE: 140 MMHG | TEMPERATURE: 98 F | DIASTOLIC BLOOD PRESSURE: 85 MMHG | HEART RATE: 74 BPM

## 2021-08-20 NOTE — ED ADULT NURSE REASSESSMENT NOTE - NS ED NURSE REASSESS COMMENT FT1
Pt. returned from sono exam. Pt. is AAOx4. Pt. has non labored breathing, speaking in full sentences with no use of accessory muscle, nasal flaring or cough. Pt. denies CP, HA, dizziness, chills, n/v/d, abdominal pain, vaginal bleeding. Ambulating with a steady gait. States she has no desire to urinate at this time. Pending results.
Pt. remains AAOx4. Denies any pain. Pt. requesting food. Food given as per MD Stoddard. Pending sono reading.

## 2022-05-03 NOTE — ED ADULT NURSE NOTE - BREATHING, MLM
Spontaneous, unlabored and symmetrical Implemented All Universal Safety Interventions:  [place] to call system. Call bell, personal items and telephone within reach. Instruct patient to call for assistance. Room bathroom lighting operational. Non-slip footwear when patient is off stretcher. Physically safe environment: no spills, clutter or unnecessary equipment. Stretcher in lowest position, wheels locked, appropriate side rails in place.

## 2022-07-12 ENCOUNTER — EMERGENCY (EMERGENCY)
Facility: HOSPITAL | Age: 27
LOS: 1 days | Discharge: HOME | End: 2022-07-12
Admitting: EMERGENCY MEDICINE

## 2022-07-12 VITALS
HEART RATE: 73 BPM | TEMPERATURE: 98 F | OXYGEN SATURATION: 98 % | RESPIRATION RATE: 18 BRPM | DIASTOLIC BLOOD PRESSURE: 81 MMHG | SYSTOLIC BLOOD PRESSURE: 117 MMHG

## 2022-07-12 VITALS
RESPIRATION RATE: 18 BRPM | OXYGEN SATURATION: 98 % | HEIGHT: 66 IN | WEIGHT: 293 LBS | HEART RATE: 87 BPM | SYSTOLIC BLOOD PRESSURE: 136 MMHG | DIASTOLIC BLOOD PRESSURE: 88 MMHG | TEMPERATURE: 98 F

## 2022-07-12 PROBLEM — E66.9 OBESITY, UNSPECIFIED: Chronic | Status: ACTIVE | Noted: 2017-08-13

## 2022-07-12 PROBLEM — A59.9 TRICHOMONIASIS, UNSPECIFIED: Chronic | Status: ACTIVE | Noted: 2018-09-17

## 2022-07-12 PROBLEM — D64.9 ANEMIA, UNSPECIFIED: Chronic | Status: ACTIVE | Noted: 2019-10-02

## 2022-07-12 PROBLEM — N39.0 URINARY TRACT INFECTION, SITE NOT SPECIFIED: Chronic | Status: ACTIVE | Noted: 2018-09-17

## 2022-07-12 LAB
APPEARANCE UR: CLEAR — SIGNIFICANT CHANGE UP
BACTERIA # UR AUTO: PRESENT /HPF
BILIRUB UR-MCNC: NEGATIVE — SIGNIFICANT CHANGE UP
COLOR SPEC: YELLOW — SIGNIFICANT CHANGE UP
DIFF PNL FLD: ABNORMAL
EPI CELLS # UR: SIGNIFICANT CHANGE UP /HPF (ref 0–5)
GLUCOSE UR QL: NEGATIVE — SIGNIFICANT CHANGE UP
HCG UR QL: NEGATIVE — SIGNIFICANT CHANGE UP
KETONES UR-MCNC: NEGATIVE — SIGNIFICANT CHANGE UP
LEUKOCYTE ESTERASE UR-ACNC: ABNORMAL
NITRITE UR-MCNC: NEGATIVE — SIGNIFICANT CHANGE UP
PH UR: 6.5 — SIGNIFICANT CHANGE UP (ref 5–8)
PROT UR-MCNC: 30 MG/DL
RBC CASTS # UR COMP ASSIST: ABNORMAL /HPF
SP GR SPEC: 1.02 — SIGNIFICANT CHANGE UP (ref 1–1.03)
UROBILINOGEN FLD QL: 0.2 E.U./DL — SIGNIFICANT CHANGE UP
WBC UR QL: ABNORMAL /HPF

## 2022-07-12 PROCEDURE — 99284 EMERGENCY DEPT VISIT MOD MDM: CPT | Mod: 25

## 2022-07-12 PROCEDURE — 72100 X-RAY EXAM L-S SPINE 2/3 VWS: CPT | Mod: 26

## 2022-07-12 RX ORDER — KETOROLAC TROMETHAMINE 30 MG/ML
30 SYRINGE (ML) INJECTION ONCE
Refills: 0 | Status: DISCONTINUED | OUTPATIENT
Start: 2022-07-12 | End: 2022-07-12

## 2022-07-12 RX ORDER — ACETAMINOPHEN 500 MG
975 TABLET ORAL ONCE
Refills: 0 | Status: COMPLETED | OUTPATIENT
Start: 2022-07-12 | End: 2022-07-12

## 2022-07-12 RX ORDER — CYCLOBENZAPRINE HYDROCHLORIDE 10 MG/1
1 TABLET, FILM COATED ORAL
Qty: 15 | Refills: 0
Start: 2022-07-12 | End: 2022-07-16

## 2022-07-12 RX ORDER — CYCLOBENZAPRINE HYDROCHLORIDE 10 MG/1
10 TABLET, FILM COATED ORAL ONCE
Refills: 0 | Status: COMPLETED | OUTPATIENT
Start: 2022-07-12 | End: 2022-07-12

## 2022-07-12 RX ORDER — LIDOCAINE 4 G/100G
1 CREAM TOPICAL ONCE
Refills: 0 | Status: COMPLETED | OUTPATIENT
Start: 2022-07-12 | End: 2022-07-12

## 2022-07-12 RX ADMIN — CYCLOBENZAPRINE HYDROCHLORIDE 10 MILLIGRAM(S): 10 TABLET, FILM COATED ORAL at 10:39

## 2022-07-12 RX ADMIN — LIDOCAINE 1 PATCH: 4 CREAM TOPICAL at 12:26

## 2022-07-12 RX ADMIN — Medication 30 MILLIGRAM(S): at 10:39

## 2022-07-12 RX ADMIN — Medication 975 MILLIGRAM(S): at 12:24

## 2022-07-12 NOTE — ED PROVIDER NOTE - MUSCULOSKELETAL, MLM
Midline tenderness to L2-L4, right sided paraspinal tenderness from L1-L5. Slightly decreased ROM of the spine secondary to pain, however it is intact. Motor and sensation intact. Mildly antalgic gait.

## 2022-07-12 NOTE — ED ADULT NURSE NOTE - NSIMPLEMENTINTERV_GEN_ALL_ED
Implemented All Universal Safety Interventions:  [place] to call system. Call bell, personal items and telephone within reach. Instruct patient to call for assistance. Room bathroom lighting operational. Non-slip footwear when patient is off stretcher. Physically safe environment: no spills, clutter or unnecessary equipment. Stretcher in lowest position, wheels locked, appropriate side rails in place.

## 2022-07-12 NOTE — ED PROVIDER NOTE - PROVIDER TOKENS
PROVIDER:[TOKEN:[[id number]],FOLLOWUP:[1-3 Days]],PROVIDER:[TOKEN:[[id number]],FOLLOWUP:[1-3 Days]]

## 2022-07-12 NOTE — ED ADULT NURSE NOTE - OBJECTIVE STATEMENT
Patient A0X3 ambulatory came to ED with c/o right lower back pain which began on Saturday of sudden onset states no precipitating factors states the pain as a constant sharp pain. Patient denies chest pain, respiratory issue, N/V/D.

## 2022-07-12 NOTE — ED ADULT NURSE NOTE - CAS DISCH CONDITION
Quality 110: Preventive Care And Screening: Influenza Immunization: Influenza Immunization previously received during influenza season Detail Level: Detailed Quality 226: Preventive Care And Screening: Tobacco Use: Screening And Cessation Intervention: Patient screened for tobacco and never smoked Stable

## 2022-07-12 NOTE — ED PROVIDER NOTE - CARE PROVIDER_API CALL
Neymar Kendrick)  Orthopaedic Surgery Surgery  [address] [address]  Phone: [phone]  Fax: [phone]  Follow Up Time: 1-3 Days    Eddie Moreno)  Orthopaedic Surgery  [address] [place] [address]  Phone: [phone]  Fax: [phone]  Follow Up Time: 1-3 Days

## 2022-07-12 NOTE — ED ADULT TRIAGE NOTE - CHIEF COMPLAINT QUOTE
c/o localized R lower back pain since Saturday, denies injury/trauma/fall or urinary sx. has not taken any otc meds. tolerating PO in triage

## 2022-07-12 NOTE — ED PROVIDER NOTE - PATIENT PORTAL LINK FT
You can access the FollowMyHealth Patient Portal offered by [facility] by registering at the following website: [email or web address]. By joining [facility]’s FollowMyHealth portal, you will also be able to view your health information using other applications (apps) compatible with our system.

## 2022-07-12 NOTE — ED PROVIDER NOTE - CHPI ED SYMPTOMS NEG
no lower extremity numbness or tingling, no bowel or bladder dysfunction, no weakness, no direct falls or trauma, no saddle anesthesia

## 2022-07-12 NOTE — ED PROVIDER NOTE - CLINICAL SUMMARY MEDICAL DECISION MAKING FREE TEXT BOX
25 y/o female with right sided lower back pain for 4 days. Pt denies any falls and found to have some mild midline as well as right sided paraspinal tenderness on exam. Clinically suspecting likely muscular related pain, however will obtain lumbar to rule out any acute findings. Will give Toradol and Flexeril for pain relief. Will refer to orthopedics. Likely D/C. Pt is a 25 y/o female with right sided lower back pain for 4 days. Pt denies any falls and found to have some mild midline as well as right sided paraspinal tenderness on exam. Clinically suspecting likely muscular related pain, however will obtain lumbar to rule out any acute findings. Will give Toradol and Flexeril for pain relief. Will refer to orthopedics. Likely D/C. Pt is a 27 y/o female with right sided lower back pain for 4 days. Pt denies any falls and found to have some mild midline as well as right sided paraspinal tenderness on exam. Clinically suspecting likely muscular related pain, however will obtain X-ray lumbar to rule out any acute findings. Will give Toradol and Flexeril for pain relief. Will refer to orthopedics. Likely D/C. Pt is a 25 y/o female with right sided lower back pain for 4 days. Pt denies any falls and found to have some mild midline as well as right sided paraspinal tenderness on exam. Clinically suspecting likely muscular related pain, however will obtain X-ray lumbar to rule out any acute findings. Will give Toradol and Flexeril for pain relief. Will refer to orthopedics. Likely D/C.    ===    added tylenol for additional relief  XR neg on wet read  UA shows blood, on menses  Will refer to ortho  Pt stable on DC

## 2022-07-12 NOTE — ED PROVIDER NOTE - CARE PROVIDERS DIRECT ADDRESSES
,DirectAddress_Unknown,[email or web address]

## 2022-07-12 NOTE — ED PROVIDER NOTE - OBJECTIVE STATEMENT
25 y/o female with no pertinent history presenting with right lower back pain since Saturday. Pt states symptoms started after she bent forward to put on socks after shower and immediately felt pain. Pt states is non-radiating but worsened with movement and ambulation. Pt states no lower extremity numbness or tingling, no bowel or bladder dysfunction, no weakness, no direct falls or trauma, no saddle anesthesia. 25 y/o female with no pertinent history presenting with right lower back pain since Saturday. Pt states symptoms started after she bent forward to put on socks after shower and immediately felt pain. Pt states pain is non-radiating but worsened with movement and ambulation. Pt states no lower extremity numbness or tingling, no bowel or bladder dysfunction, no weakness, no direct falls or trauma, no saddle anesthesia.

## 2022-07-14 LAB
-  AMIKACIN: SIGNIFICANT CHANGE UP
-  AMOXICILLIN/CLAVULANIC ACID: SIGNIFICANT CHANGE UP
-  AMPICILLIN/SULBACTAM: SIGNIFICANT CHANGE UP
-  AMPICILLIN: SIGNIFICANT CHANGE UP
-  AZTREONAM: SIGNIFICANT CHANGE UP
-  CEFAZOLIN: SIGNIFICANT CHANGE UP
-  CEFEPIME: SIGNIFICANT CHANGE UP
-  CEFOXITIN: SIGNIFICANT CHANGE UP
-  CEFTRIAXONE: SIGNIFICANT CHANGE UP
-  CIPROFLOXACIN: SIGNIFICANT CHANGE UP
-  ERTAPENEM: SIGNIFICANT CHANGE UP
-  GENTAMICIN: SIGNIFICANT CHANGE UP
-  LEVOFLOXACIN: SIGNIFICANT CHANGE UP
-  MEROPENEM: SIGNIFICANT CHANGE UP
-  NITROFURANTOIN: SIGNIFICANT CHANGE UP
-  PIPERACILLIN/TAZOBACTAM: SIGNIFICANT CHANGE UP
-  TOBRAMYCIN: SIGNIFICANT CHANGE UP
-  TRIMETHOPRIM/SULFAMETHOXAZOLE: SIGNIFICANT CHANGE UP
CULTURE RESULTS: SIGNIFICANT CHANGE UP
METHOD TYPE: SIGNIFICANT CHANGE UP
ORGANISM # SPEC MICROSCOPIC CNT: SIGNIFICANT CHANGE UP
ORGANISM # SPEC MICROSCOPIC CNT: SIGNIFICANT CHANGE UP
SPECIMEN SOURCE: SIGNIFICANT CHANGE UP

## 2022-07-14 RX ORDER — NITROFURANTOIN MACROCRYSTAL 50 MG
1 CAPSULE ORAL
Qty: 14 | Refills: 0
Start: 2022-07-14 | End: 2022-07-20

## 2022-07-15 DIAGNOSIS — M54.50 LOW BACK PAIN, UNSPECIFIED: ICD-10-CM

## 2022-07-15 DIAGNOSIS — Z87.440 PERSONAL HISTORY OF URINARY (TRACT) INFECTIONS: ICD-10-CM

## 2022-07-15 DIAGNOSIS — Z91.013 ALLERGY TO SEAFOOD: ICD-10-CM

## 2022-07-15 DIAGNOSIS — Z97.5 PRESENCE OF (INTRAUTERINE) CONTRACEPTIVE DEVICE: ICD-10-CM

## 2022-07-15 DIAGNOSIS — Z91.040 LATEX ALLERGY STATUS: ICD-10-CM

## 2022-07-15 DIAGNOSIS — D64.9 ANEMIA, UNSPECIFIED: ICD-10-CM

## 2022-07-15 DIAGNOSIS — E66.9 OBESITY, UNSPECIFIED: ICD-10-CM

## 2022-11-02 NOTE — ED ADULT NURSE NOTE - PRIMARY CARE PROVIDER
Unknown Recommendation Preamble: The following recommendations were made during the visit: try dermend bruise formula for bruising. Use a good moisturizer. Using Vaseline when or if there are skin tears helps healing. Render Risk Assessment In Note?: yes Detail Level: Generalized

## 2022-11-13 ENCOUNTER — EMERGENCY (EMERGENCY)
Facility: HOSPITAL | Age: 27
LOS: 1 days | Discharge: HOME | End: 2022-11-13
Attending: EMERGENCY MEDICINE | Admitting: EMERGENCY MEDICINE

## 2022-11-13 VITALS
WEIGHT: 293 LBS | SYSTOLIC BLOOD PRESSURE: 131 MMHG | RESPIRATION RATE: 16 BRPM | HEIGHT: 66 IN | DIASTOLIC BLOOD PRESSURE: 85 MMHG | TEMPERATURE: 99 F | OXYGEN SATURATION: 97 % | HEART RATE: 104 BPM

## 2022-11-13 VITALS
TEMPERATURE: 98 F | RESPIRATION RATE: 16 BRPM | DIASTOLIC BLOOD PRESSURE: 77 MMHG | HEART RATE: 99 BPM | SYSTOLIC BLOOD PRESSURE: 117 MMHG | OXYGEN SATURATION: 98 %

## 2022-11-13 DIAGNOSIS — Z86.19 PERSONAL HISTORY OF OTHER INFECTIOUS AND PARASITIC DISEASES: ICD-10-CM

## 2022-11-13 DIAGNOSIS — E66.9 OBESITY, UNSPECIFIED: ICD-10-CM

## 2022-11-13 DIAGNOSIS — Z91.013 ALLERGY TO SEAFOOD: ICD-10-CM

## 2022-11-13 DIAGNOSIS — Z20.822 CONTACT WITH AND (SUSPECTED) EXPOSURE TO COVID-19: ICD-10-CM

## 2022-11-13 DIAGNOSIS — Z86.2 PERSONAL HISTORY OF DISEASES OF THE BLOOD AND BLOOD-FORMING ORGANS AND CERTAIN DISORDERS INVOLVING THE IMMUNE MECHANISM: ICD-10-CM

## 2022-11-13 DIAGNOSIS — Z87.440 PERSONAL HISTORY OF URINARY (TRACT) INFECTIONS: ICD-10-CM

## 2022-11-13 DIAGNOSIS — R07.89 OTHER CHEST PAIN: ICD-10-CM

## 2022-11-13 DIAGNOSIS — Z91.040 LATEX ALLERGY STATUS: ICD-10-CM

## 2022-11-13 DIAGNOSIS — R00.0 TACHYCARDIA, UNSPECIFIED: ICD-10-CM

## 2022-11-13 DIAGNOSIS — J11.1 INFLUENZA DUE TO UNIDENTIFIED INFLUENZA VIRUS WITH OTHER RESPIRATORY MANIFESTATIONS: ICD-10-CM

## 2022-11-13 LAB
FLUAV AG NPH QL: DETECTED
FLUBV AG NPH QL: SIGNIFICANT CHANGE UP
RSV RNA NPH QL NAA+NON-PROBE: SIGNIFICANT CHANGE UP
SARS-COV-2 RNA SPEC QL NAA+PROBE: SIGNIFICANT CHANGE UP

## 2022-11-13 PROCEDURE — 99285 EMERGENCY DEPT VISIT HI MDM: CPT

## 2022-11-13 PROCEDURE — 93010 ELECTROCARDIOGRAM REPORT: CPT

## 2022-11-13 PROCEDURE — 71046 X-RAY EXAM CHEST 2 VIEWS: CPT | Mod: 26

## 2022-11-13 RX ORDER — IPRATROPIUM/ALBUTEROL SULFATE 18-103MCG
3 AEROSOL WITH ADAPTER (GRAM) INHALATION ONCE
Refills: 0 | Status: COMPLETED | OUTPATIENT
Start: 2022-11-13 | End: 2022-11-13

## 2022-11-13 RX ORDER — IBUPROFEN 200 MG
600 TABLET ORAL ONCE
Refills: 0 | Status: COMPLETED | OUTPATIENT
Start: 2022-11-13 | End: 2022-11-13

## 2022-11-13 RX ADMIN — Medication 3 MILLILITER(S): at 11:35

## 2022-11-13 RX ADMIN — Medication 600 MILLIGRAM(S): at 11:36

## 2022-11-13 NOTE — ED ADULT TRIAGE NOTE - GLASGOW COMA SCALE: BEST MOTOR RESPONSE, MLM
(M6) obeys commands
patient to ER for evaluation of nausea x 5 days. patient being treated for bacterial meningitis in rehab facility, states she is unable to tolerate po fluid/nutrition. Denies chest pain/sob, no abdominal pain. Awaiting further medical evaluation will continue to monitor.

## 2022-11-13 NOTE — ED ADULT NURSE NOTE - SUICIDE SCREENING QUESTION 2
Dr Gurrola's office calling for preop clearance, can you please addend you note , it still says pending labs    No

## 2022-11-13 NOTE — ED PROVIDER NOTE - CLINICAL SUMMARY MEDICAL DECISION MAKING FREE TEXT BOX
ED course: vital signs noted, patient is afebrile and mildly tachycardic at 104, rest of vitals signs are within normal limits. Workup includes flu and covid swab, chest x-ray, ibuprofen, albuterol nebs, and reassessment. 27 y/o obese female denies any PMHx and chronic medicine use s/p c section x1 presents with cough, congestions, malaise, HA, chest tightness, and SOB since Friday(11/11). Denies abd pain, fever/chills, pregnancy, N/V/D, and urinary symptoms. Patient has not tested for covid recently. Patient is covid vaccinated x2 with no flu vaccine this season.  ED course: vital signs noted, patient is afebrile and mildly tachycardic at 104, rest of vitals signs are within normal limits. Workup includes flu and covid swab, chest x-ray, ibuprofen, albuterol nebs, and reassessment.   Pt tested positive for Influenza A today. CXR with NAD. Given Rx for Tamiflu. Non-toxic appearing and stable for discharge. To follow up outpatient. Strict return precautions given.

## 2022-11-13 NOTE — ED PROVIDER NOTE - OBJECTIVE STATEMENT
25 y/o obese female denies any PMHx and chronic medicine use s/p c section x1 presents with cough, congestions, malaise, HA, chest tightness, and SOB since Friday(11/11). Denies abd pain, fever/chills, pregnancy, N/V/D, and urinary symptoms. Patient has not tested for covid recently. Patient is covid vaccinated x2 with no flu vaccine this season.

## 2022-11-13 NOTE — ED PROVIDER NOTE - NSFOLLOWUPINSTRUCTIONS_ED_ALL_ED_FT
You tested positive for the Flu. Please return to the ER if you develop any concerning symptoms.     Influenza, Adult      Influenza, also called "the flu," is a viral infection that mainly affects the respiratory tract. This includes the lungs, nose, and throat. The flu spreads easily from person to person (is contagious). It causes common cold symptoms, along with high fever and body aches.      What are the causes?    This condition is caused by the influenza virus. You can get the virus by:  •Breathing in droplets that are in the air from an infected person's cough or sneeze.      •Touching something that has the virus on it (has been contaminated) and then touching your mouth, nose, or eyes.        What increases the risk?    The following factors may make you more likely to get the flu:  •Not washing or sanitizing your hands often.      •Having close contact with many people during cold and flu season.      •Touching your mouth, eyes, or nose without first washing or sanitizing your hands.      •Not getting an annual flu shot.      You may have a higher risk for the flu, including serious problems, such as a lung infection (pneumonia), if you:  •Are older than 65.      •Are pregnant.      •Have a weakened disease-fighting system (immune system). This includes people who have HIV or AIDS, are on chemotherapy, or are taking medicines that reduce (suppress) the immune system.      •Have a long-term (chronic) illness, such as heart disease, kidney disease, diabetes, or lung disease.      •Have a liver disorder.      •Are severely overweight (morbidly obese).      •Have anemia.      •Have asthma.        What are the signs or symptoms?    Symptoms of this condition usually begin suddenly and last 4–14 days. These may include:  •Fever and chills.      •Headaches, body aches, or muscle aches.      •Sore throat.      •Cough.      •Runny or stuffy (congested) nose.      •Chest discomfort.      •Poor appetite.      •Weakness or fatigue.      •Dizziness.      •Nausea or vomiting.        How is this diagnosed?    This condition may be diagnosed based on:  •Your symptoms and medical history.      •A physical exam.       •Swabbing your nose or throat and testing the fluid for the influenza virus.        How is this treated?    If the flu is diagnosed early, you can be treated with antiviral medicine that is given by mouth (orally) or through an IV. This can help reduce how severe the illness is and how long it lasts.    Taking care of yourself at home can help relieve symptoms. Your health care provider may recommend:  •Taking over-the-counter medicines.      •Drinking plenty of fluids.      In many cases, the flu goes away on its own. If you have severe symptoms or complications, you may be treated in a hospital.      Follow these instructions at home:    Activity     •Rest as needed and get plenty of sleep.      •Stay home from work or school as told by your health care provider. Unless you are visiting your health care provider, avoid leaving home until your fever has been gone for 24 hours without taking medicine.      Eating and drinking     •Take an oral rehydration solution (ORS). This is a drink that is sold at pharmacies and retail stores.      •Drink enough fluid to keep your urine pale yellow.      •Drink clear fluids in small amounts as you are able. Clear fluids include water, ice chips, fruit juice mixed with water, and low-calorie sports drinks.      •Eat bland, easy-to-digest foods in small amounts as you are able. These foods include bananas, applesauce, rice, lean meats, toast, and crackers.      •Avoid drinking fluids that contain a lot of sugar or caffeine, such as energy drinks, regular sports drinks, and soda.      •Avoid alcohol.      •Avoid spicy or fatty foods.        General instructions                   •Take over-the-counter and prescription medicines only as told by your health care provider.    •Use a cool mist humidifier to add humidity to the air in your home. This can make it easier to breathe.  •When using a cool mist humidifier, clean it daily. Empty the water and replace it with clean water.        •Cover your mouth and nose when you cough or sneeze.      •Wash your hands with soap and water often and for at least 20 seconds, especially after you cough or sneeze. If soap and water are not available, use alcohol-based hand .      •Keep all follow-up visits. This is important.        How is this prevented?     •Get an annual flu shot. This is usually available in late summer, fall, or winter. Ask your health care provider when you should get your flu shot.      •Avoid contact with people who are sick during cold and flu season. This is generally fall and winter.        Contact a health care provider if:    •You develop new symptoms.    •You have:  •Chest pain.      •Diarrhea.      •A fever.        •Your cough gets worse.      •You produce more mucus.      •You feel nauseous or you vomit.        Get help right away if you:    •Develop shortness of breath or have difficulty breathing.      •Have skin or nails that turn a bluish color.      •Have severe pain or stiffness in your neck.      •Develop a sudden headache or sudden pain in your face or ear.      •Cannot eat or drink without vomiting.      These symptoms may represent a serious problem that is an emergency. Do not wait to see if the symptoms will go away. Get medical help right away. Call your local emergency services (911 in the [place] Do not drive yourself to the hospital.       Summary    •Influenza, also called "the flu," is a viral infection that primarily affects your respiratory tract.      •Symptoms of the flu usually begin suddenly and last 4–14 days.      •Getting an annual flu shot is the best way to prevent getting the flu.      •Stay home from work or school as told by your health care provider. Unless you are visiting your health care provider, avoid leaving home until your fever has been gone for 24 hours without taking medicine.      •Keep all follow-up visits. This is important.      This information is not intended to replace advice given to you by your health care provider. Make sure you discuss any questions you have with your health care provider.

## 2022-11-13 NOTE — ED PROVIDER NOTE - PATIENT PORTAL LINK FT
You can access the FollowMyHealth Patient Portal offered by [facility] by registering at the following website: [email or web address]. By joining [employer]’s FollowMyHealth portal, you will also be able to view your health information using other applications (apps) compatible with our system.

## 2022-12-03 ENCOUNTER — EMERGENCY (EMERGENCY)
Facility: HOSPITAL | Age: 27
LOS: 1 days | Discharge: HOME | End: 2022-12-03
Admitting: EMERGENCY MEDICINE

## 2022-12-03 VITALS
SYSTOLIC BLOOD PRESSURE: 118 MMHG | HEIGHT: 66 IN | DIASTOLIC BLOOD PRESSURE: 81 MMHG | OXYGEN SATURATION: 95 % | HEART RATE: 90 BPM | TEMPERATURE: 98 F | RESPIRATION RATE: 16 BRPM

## 2022-12-03 LAB
FLUAV AG NPH QL: SIGNIFICANT CHANGE UP
FLUBV AG NPH QL: SIGNIFICANT CHANGE UP
RSV RNA NPH QL NAA+NON-PROBE: SIGNIFICANT CHANGE UP
S PYO AG SPEC QL IA: NEGATIVE — SIGNIFICANT CHANGE UP
SARS-COV-2 RNA SPEC QL NAA+PROBE: SIGNIFICANT CHANGE UP

## 2022-12-03 PROCEDURE — 99284 EMERGENCY DEPT VISIT MOD MDM: CPT

## 2022-12-03 RX ORDER — ALBUTEROL 90 UG/1
1 AEROSOL, METERED ORAL ONCE
Refills: 0 | Status: COMPLETED | OUTPATIENT
Start: 2022-12-03 | End: 2022-12-03

## 2022-12-03 RX ORDER — OFLOXACIN 0.3 %
1 DROPS OPHTHALMIC (EYE) ONCE
Refills: 0 | Status: COMPLETED | OUTPATIENT
Start: 2022-12-03 | End: 2022-12-03

## 2022-12-03 RX ORDER — AZITHROMYCIN 500 MG/1
500 TABLET, FILM COATED ORAL ONCE
Refills: 0 | Status: COMPLETED | OUTPATIENT
Start: 2022-12-03 | End: 2022-12-03

## 2022-12-03 RX ORDER — DEXAMETHASONE 0.5 MG/5ML
6 ELIXIR ORAL ONCE
Refills: 0 | Status: COMPLETED | OUTPATIENT
Start: 2022-12-03 | End: 2022-12-03

## 2022-12-03 RX ADMIN — Medication 1 DROP(S): at 23:55

## 2022-12-03 RX ADMIN — Medication 500 MILLIGRAM(S): at 23:55

## 2022-12-03 RX ADMIN — Medication 200 MILLIGRAM(S): at 23:55

## 2022-12-03 RX ADMIN — Medication 6 MILLIGRAM(S): at 23:55

## 2022-12-03 NOTE — ED PROVIDER NOTE - OBJECTIVE STATEMENT
26 yo F with recent flu x 2 wks ago, anemia, UTI in the past, vaccinated for COVID x 2, presenting c/o L eye redness, body aches, cough, sore throat x 1 wk. Pt reports having URI sx, dry cough, body aches x 1 wk, noted sore throat and L eye redness and dc x 2-3 days. Denies fever, chills, SOB, CP, palpitations, wheezing, abdominal pain, N/V/D/C, change in urinary/bowel function, dysuria, hematuria, flank pain, malaise, rash, HA, vision changes, diplopia, and dizziness.  No recent travel or sick contact noted.

## 2022-12-03 NOTE — ED ADULT TRIAGE NOTE - CHIEF COMPLAINT QUOTE
Pt walked in with multiple medical complaints. States has been having nonproductive cough, chest pain, and sore throat. Also endorsing L eye redness that started today.

## 2022-12-03 NOTE — ED PROVIDER NOTE - PHYSICAL EXAMINATION
Vital Signs - nursing notes reviewed and confirmed  Gen - WDWN F, BMI>35, NAD, comfortable and non-toxic appearing, speaking in full sentences   Skin - warm, dry, intact  HEENT - AT/NC, PERRL, sclera clear, moist oral mucosa, TM intact b/l with good cone of lights, o/p clear with no erythema, edema, or exudate, uvula midline, airway patent, neck supple and NT, FROM, no JVD or carotid bruits b/l, no palpable nodes  CV - S1S2, R/R/R  Resp - respiration non-labored, CTAB, symmetric bs b/l, no r/r/w  GI - NABS, soft, ND, NT, no rebound or guarding, no CVAT b/l   MS - w/w/p, no c/c/e, calves supple and NT, distal pulses symmetric b/l, brisk cap refills, +SILT, NV intact, FROM, compartment soft  Neuro - AxOx3, ambulatory without gait disturbance  Psych - Cooperative, appropriate mood, language/behaviors

## 2022-12-03 NOTE — ED PROVIDER NOTE - NSFOLLOWUPINSTRUCTIONS_ED_ALL_ED_FT
Viral Syndrome    WHAT YOU NEED TO KNOW:    What is viral syndrome? Viral syndrome is a term used for symptoms of an infection caused by a virus. Viruses are spread easily from person to person through the air and on shared items.    What are the signs and symptoms of viral syndrome? Signs and symptoms may start slowly or suddenly and last hours to days. They can be mild to severe and can change over days or hours. You may have any of the following:  •Fever and chills      •A runny or stuffy nose      •Cough, sore throat, or hoarseness      •Headache, or pain and pressure around your eyes      •Muscle aches and joint pain      •Shortness of breath or wheezing      •Abdominal pain, cramps, and diarrhea      •Nausea, vomiting, or loss of appetite      How is viral syndrome diagnosed and treated? Your healthcare provider will ask about your symptoms and examine you. Antibiotics are not given for a viral infection. The following may help you feel better:   •Acetaminophen decreases pain and fever. It is available without a doctor's order. Ask how much to take and how often to take it. Follow directions. Read the labels of all other medicines you are using to see if they also contain acetaminophen, or ask your doctor or pharmacist. Acetaminophen can cause liver damage if not taken correctly. Do not use more than 4 grams (4,000 milligrams) total of acetaminophen in one day.       •NSAIDs, such as ibuprofen, help decrease swelling, pain, and fever. NSAIDs can cause stomach bleeding or kidney problems in certain people. If you take blood thinner medicine, always ask your healthcare provider if NSAIDs are safe for you. Always read the medicine label and follow directions.      •Cold medicine helps decrease swelling, control a cough, and relieve chest or nasal congestion.      •Saline nasal spray helps relieve congestion in your sinuses.      How can I manage my symptoms?   •Drink liquids as directed to prevent dehydration. Ask how much liquid to drink each day and which liquids are best for you. Ask if you should drink an oral rehydration solution (ORS). An ORS has the right amounts of water, salts, and sugar you need to replace body fluids. This may help prevent dehydration caused by vomiting or diarrhea. Do not drink liquids with caffeine. Liquids with caffeine can make dehydration worse.      •Get plenty of rest to help your body heal. Take naps throughout the day. Ask your healthcare provider when you can return to work and your normal activities.      •Use a cool mist humidifier to help you breathe easier. Ask your healthcare provider how to use a cool mist humidifier.      •Eat honey or use cough drops for a sore throat. Cough drops are available without a doctor's order. Follow directions for taking cough drops.      •Do not smoke or be close to anyone who is smoking. Nicotine and other chemicals in cigarettes and cigars can cause lung damage. Smoking can also delay healing. Ask your healthcare provider for information if you currently smoke and need help to quit. E-cigarettes or smokeless tobacco still contain nicotine. Talk to your healthcare provider before you use these products.      What can I do to prevent the spread of germs?          •Wash your hands often. Wash your hands several times each day. Wash after you use the bathroom, change a child's diaper, and before you prepare or eat food. Use soap and water every time. Rub your soapy hands together, lacing your fingers. Wash the front and back of your hands, and in between your fingers. Use the fingers of one hand to scrub under the fingernails of the other hand. Wash for at least 20 seconds. Rinse with warm, running water for several seconds. Then dry your hands with a clean towel or paper towel. Use hand  that contains alcohol if soap and water are not available. Do not touch your eyes, nose, or mouth without washing your hands first.  Handwashing           •Cover a sneeze or cough. Use a tissue that covers your mouth and nose. Throw the tissue away in a trash can right away. Use the bend of your arm if a tissue is not available. Wash your hands well with soap and water or use a hand .      •Stay away from others while you are sick. Avoid crowds as much as possible.      •Ask about vaccines you may need. Talk to your healthcare provider about your vaccine history. He or she will tell you which vaccines you need, and when to get them.?Get the influenza (flu) vaccine as soon as recommended each year. The flu vaccine is available starting in September or October. Flu viruses change, so it is important to get a flu vaccine every year.      ?Get the pneumonia vaccine if recommended. This vaccine is usually recommended every 5 years. Your provider will tell you when to get this vaccine, if needed.        Call your local emergency number (320 in the [place]) or have someone else call if:   •You have a seizure.      •You cannot be woken.      •You have chest pain or trouble breathing.      When should I seek immediate care?   •You have a stiff neck, a bad headache, and sensitivity to light.      •You feel weak, dizzy, or confused.      •You stop urinating or urinate a lot less than usual.      •You cough up blood or thick yellow or green mucus.      •You have severe abdominal pain or your abdomen is larger than usual.      When should I call my doctor?   •Your symptoms do not get better with treatment or get worse after 3 days.      •You have a rash or ear pain.      •You have burning when you urinate.      •You have questions or concerns about your condition or care       Bacterial Conjunctivitis, Adult      Bacterial conjunctivitis is an infection of the clear membrane that covers the white part of the eye and the inner surface of the eyelid (conjunctiva). When the blood vessels in the conjunctiva become inflamed, the eye becomes red or pink. The eye often feels irritated or itchy. Bacterial conjunctivitis spreads easily from person to person (is contagious). It also spreads easily from one eye to the other eye.      What are the causes?    This condition is caused by bacteria. You may get the infection if you come into close contact with:  •A person who is infected with the bacteria.      •Items that are contaminated with the bacteria, such as a face towel, contact lens solution, or eye makeup.        What increases the risk?    You are more likely to develop this condition if:  •You are exposed to other people who have the infection.      •You wear contact lenses.      •You have a sinus infection.      •You have had a recent eye injury or surgery.      •You have a weak body defense system (immune system).      •You have a medical condition that causes dry eyes.        What are the signs or symptoms?  A normal eye compared to an eye with bacterial conjunctivitis.    Symptoms of this condition include:  •Thick, yellowish discharge from the eye. This may turn into a crust on the eyelid overnight and cause your eyelids to stick together.      •Tearing or watery eyes.      •Itchy eyes.      •Burning feeling in your eyes.      •Eye redness.      •Swollen eyelids.      •Blurred vision.        How is this diagnosed?    This condition is diagnosed based on your symptoms and medical history. Your health care provider may also take a sample of discharge from your eye to find the cause of your infection.      How is this treated?  A person putting eye drops in an eye.   This condition may be treated with:  •Antibiotic eye drops or ointment to clear the infection more quickly and prevent the spread of infection to others.      •Antibiotic medicines taken by mouth (orally) to treat infections that do not respond to drops or ointments or that last longer than 10 days.      •Cool, wet cloths (cool compresses) placed on the eyes.      •Artificial tears applied 2–6 times a day.        Follow these instructions at home:    Medicines     •Take or apply your antibiotic medicine as told by your health care provider. Do not stop using the antibiotic, even if your condition improves, unless directed by your health care provider.      •Take or apply over-the-counter and prescription medicines only as told by your health care provider.      •Be very careful to avoid touching the edge of your eyelid with the eye-drop bottle or the ointment tube when you apply medicines to the affected eye. This will keep you from spreading the infection to your other eye or to other people.      Managing discomfort     •Gently wipe away any drainage from your eye with a warm, wet washcloth or a cotton ball.      •Apply a clean, cool compress to your eye for 10–20 minutes, 3–4 times a day.      General instructions     • Do not wear contact lenses until the inflammation is gone and your health care provider says it is safe to wear them again. Ask your health care provider how to sterilize or replace your contact lenses before you use them again. Wear glasses until you can resume wearing contact lenses.      •Avoid wearing eye makeup until the inflammation is gone. Throw away any old eye cosmetics that may be contaminated.      •Change or wash your pillowcase every day.      • Do not share towels or washcloths. This may spread the infection.      •Wash your hands often with soap and water for at least 20 seconds and especially before touching your face or eyes. Use paper towels to dry your hands.      •Avoid touching or rubbing your eyes.      • Do not drive or use heavy machinery if your vision is blurred.        Contact a health care provider if:    •You have a fever.      •Your symptoms do not get better after 10 days.        Get help right away if:    •You have a fever and your symptoms suddenly get worse.      •You have severe pain when you move your eye.      •You have facial pain, redness, or swelling.      •You have a sudden loss of vision.        Summary    •Bacterial conjunctivitis is an infection of the clear membrane that covers the white part of the eye and the inner surface of the eyelid (conjunctiva).      •Bacterial conjunctivitis spreads easily from eye to eye and from person to person (is contagious).      •Wash your hands often with soap and water for at least 20 seconds and especially before touching your face or eyes. Use paper towels to dry your hands.      •Take or apply your antibiotic medicine as told by your health care provider. Do not stop using the antibiotic even if your condition improves.      •Contact a health care provider if you have a fever or if your symptoms do not get better after 10 days. Get help right away if you have a sudden loss of vision.

## 2022-12-03 NOTE — ED PROVIDER NOTE - CLINICAL SUMMARY MEDICAL DECISION MAKING FREE TEXT BOX
pt p/w sx suggestive of viral syndrome, well appearing and hemodynamically stable, s/p supportive care with improvement, rapid strep negative, viral swab obtained and sent, hand hygiene and eye gtt provided, instructions and strict return precautions discussed, AFVSS at time of dc, medically stable for dc

## 2022-12-03 NOTE — ED PROVIDER NOTE - PATIENT PORTAL LINK FT
You can access the FollowMyHealth Patient Portal offered by [facility] by registering at the following website: [email or web address]. By joining [employer]’s FollowMyHealth portal, you will also be able to view your health information using other applications (apps) compatible with our system.

## 2022-12-04 RX ORDER — GUAIFENESIN/DEXTROMETHORPHAN 600MG-30MG
10 TABLET, EXTENDED RELEASE 12 HR ORAL
Qty: 200 | Refills: 0
Start: 2022-12-04

## 2022-12-04 RX ORDER — AZITHROMYCIN 500 MG/1
1 TABLET, FILM COATED ORAL
Qty: 3 | Refills: 0
Start: 2022-12-04 | End: 2022-12-06

## 2022-12-04 RX ADMIN — ALBUTEROL 1 PUFF(S): 90 AEROSOL, METERED ORAL at 00:10

## 2022-12-07 DIAGNOSIS — Z87.440 PERSONAL HISTORY OF URINARY (TRACT) INFECTIONS: ICD-10-CM

## 2022-12-07 DIAGNOSIS — Z86.2 PERSONAL HISTORY OF DISEASES OF THE BLOOD AND BLOOD-FORMING ORGANS AND CERTAIN DISORDERS INVOLVING THE IMMUNE MECHANISM: ICD-10-CM

## 2022-12-07 DIAGNOSIS — Z20.822 CONTACT WITH AND (SUSPECTED) EXPOSURE TO COVID-19: ICD-10-CM

## 2022-12-07 DIAGNOSIS — J02.9 ACUTE PHARYNGITIS, UNSPECIFIED: ICD-10-CM

## 2022-12-07 DIAGNOSIS — Z91.013 ALLERGY TO SEAFOOD: ICD-10-CM

## 2022-12-07 DIAGNOSIS — Z86.19 PERSONAL HISTORY OF OTHER INFECTIOUS AND PARASITIC DISEASES: ICD-10-CM

## 2022-12-07 DIAGNOSIS — Z91.040 LATEX ALLERGY STATUS: ICD-10-CM

## 2022-12-07 DIAGNOSIS — E66.9 OBESITY, UNSPECIFIED: ICD-10-CM

## 2022-12-07 DIAGNOSIS — B34.9 VIRAL INFECTION, UNSPECIFIED: ICD-10-CM

## 2022-12-07 DIAGNOSIS — H10.9 UNSPECIFIED CONJUNCTIVITIS: ICD-10-CM

## 2023-03-20 NOTE — ED PROVIDER NOTE - NS_HEARTSCHISTORY_ED_A_ED_NU
Notified patient of the date & time of financial phone call appt.  Pt aware appt is a telephone call.    Dashboard updated  Appt. 03.21.2023  
Slightly Suspicious

## 2023-05-12 ENCOUNTER — EMERGENCY (EMERGENCY)
Facility: HOSPITAL | Age: 28
LOS: 1 days | Discharge: HOME | End: 2023-05-12
Admitting: EMERGENCY MEDICINE
Payer: MEDICAID

## 2023-05-12 VITALS
DIASTOLIC BLOOD PRESSURE: 79 MMHG | HEART RATE: 81 BPM | RESPIRATION RATE: 20 BRPM | SYSTOLIC BLOOD PRESSURE: 128 MMHG | OXYGEN SATURATION: 96 % | TEMPERATURE: 98 F

## 2023-05-12 LAB
ALBUMIN SERPL ELPH-MCNC: 3.2 G/DL — LOW (ref 3.4–5)
ALP SERPL-CCNC: 82 U/L — SIGNIFICANT CHANGE UP (ref 40–120)
ALT FLD-CCNC: 17 U/L — SIGNIFICANT CHANGE UP (ref 12–42)
ANION GAP SERPL CALC-SCNC: 5 MMOL/L — LOW (ref 9–16)
AST SERPL-CCNC: 13 U/L — LOW (ref 15–37)
BASOPHILS # BLD AUTO: 0.02 K/UL — SIGNIFICANT CHANGE UP (ref 0–0.2)
BASOPHILS NFR BLD AUTO: 0.3 % — SIGNIFICANT CHANGE UP (ref 0–2)
BILIRUB SERPL-MCNC: 0.2 MG/DL — SIGNIFICANT CHANGE UP (ref 0.2–1.2)
BUN SERPL-MCNC: 15 MG/DL — SIGNIFICANT CHANGE UP (ref 7–23)
CALCIUM SERPL-MCNC: 8.5 MG/DL — SIGNIFICANT CHANGE UP (ref 8.5–10.5)
CHLORIDE SERPL-SCNC: 107 MMOL/L — SIGNIFICANT CHANGE UP (ref 96–108)
CO2 SERPL-SCNC: 28 MMOL/L — SIGNIFICANT CHANGE UP (ref 22–31)
CREAT SERPL-MCNC: 0.95 MG/DL — SIGNIFICANT CHANGE UP (ref 0.5–1.3)
D DIMER BLD IA.RAPID-MCNC: 196 NG/ML DDU — SIGNIFICANT CHANGE UP
EGFR: 85 ML/MIN/1.73M2 — SIGNIFICANT CHANGE UP
EOSINOPHIL # BLD AUTO: 0.13 K/UL — SIGNIFICANT CHANGE UP (ref 0–0.5)
EOSINOPHIL NFR BLD AUTO: 2.2 % — SIGNIFICANT CHANGE UP (ref 0–6)
GLUCOSE SERPL-MCNC: 109 MG/DL — HIGH (ref 70–99)
HCG SERPL-ACNC: <1 MIU/ML — SIGNIFICANT CHANGE UP
HCT VFR BLD CALC: 36.1 % — SIGNIFICANT CHANGE UP (ref 34.5–45)
HGB BLD-MCNC: 11 G/DL — LOW (ref 11.5–15.5)
IMM GRANULOCYTES NFR BLD AUTO: 0.2 % — SIGNIFICANT CHANGE UP (ref 0–0.9)
LYMPHOCYTES # BLD AUTO: 2.24 K/UL — SIGNIFICANT CHANGE UP (ref 1–3.3)
LYMPHOCYTES # BLD AUTO: 38.4 % — SIGNIFICANT CHANGE UP (ref 13–44)
MCHC RBC-ENTMCNC: 24.6 PG — LOW (ref 27–34)
MCHC RBC-ENTMCNC: 30.5 GM/DL — LOW (ref 32–36)
MCV RBC AUTO: 80.6 FL — SIGNIFICANT CHANGE UP (ref 80–100)
MONOCYTES # BLD AUTO: 0.44 K/UL — SIGNIFICANT CHANGE UP (ref 0–0.9)
MONOCYTES NFR BLD AUTO: 7.5 % — SIGNIFICANT CHANGE UP (ref 2–14)
NEUTROPHILS # BLD AUTO: 3 K/UL — SIGNIFICANT CHANGE UP (ref 1.8–7.4)
NEUTROPHILS NFR BLD AUTO: 51.4 % — SIGNIFICANT CHANGE UP (ref 43–77)
NRBC # BLD: 0 /100 WBCS — SIGNIFICANT CHANGE UP (ref 0–0)
PLATELET # BLD AUTO: 314 K/UL — SIGNIFICANT CHANGE UP (ref 150–400)
POTASSIUM SERPL-MCNC: 4.3 MMOL/L — SIGNIFICANT CHANGE UP (ref 3.5–5.3)
POTASSIUM SERPL-SCNC: 4.3 MMOL/L — SIGNIFICANT CHANGE UP (ref 3.5–5.3)
PROT SERPL-MCNC: 7 G/DL — SIGNIFICANT CHANGE UP (ref 6.4–8.2)
RBC # BLD: 4.48 M/UL — SIGNIFICANT CHANGE UP (ref 3.8–5.2)
RBC # FLD: 15.3 % — HIGH (ref 10.3–14.5)
SODIUM SERPL-SCNC: 140 MMOL/L — SIGNIFICANT CHANGE UP (ref 132–145)
TROPONIN I, HIGH SENSITIVITY RESULT: 6.1 NG/L — SIGNIFICANT CHANGE UP
WBC # BLD: 5.84 K/UL — SIGNIFICANT CHANGE UP (ref 3.8–10.5)
WBC # FLD AUTO: 5.84 K/UL — SIGNIFICANT CHANGE UP (ref 3.8–10.5)

## 2023-05-12 PROCEDURE — 71046 X-RAY EXAM CHEST 2 VIEWS: CPT | Mod: 26

## 2023-05-12 PROCEDURE — 99285 EMERGENCY DEPT VISIT HI MDM: CPT

## 2023-05-12 RX ORDER — IBUPROFEN 200 MG
600 TABLET ORAL ONCE
Refills: 0 | Status: COMPLETED | OUTPATIENT
Start: 2023-05-12 | End: 2023-05-12

## 2023-05-12 RX ADMIN — Medication 600 MILLIGRAM(S): at 02:48

## 2023-05-12 NOTE — ED ADULT TRIAGE NOTE - CHIEF COMPLAINT QUOTE
Pt walk in c/o intermittent SOB x12 hours w/ CP with inhalation. Pt denies PMH, recent travel, use of cigarettes or birth control. Pt states "it feels like I'm wheezing". No audible wheezing noted. Pt able to speak in full/clear sentences w/out difficulty.

## 2023-05-12 NOTE — ED PROVIDER NOTE - PATIENT PORTAL LINK FT
You can access the FollowMyHealth Patient Portal offered by [facility] by registering at the following website: [email or web address]. By joining [employer]’s FollowMyHealth portal, you will also be able to view your health information using other applications (apps) compatible with our system.

## 2023-05-12 NOTE — ED PROVIDER NOTE - CLINICAL SUMMARY MEDICAL DECISION MAKING FREE TEXT BOX
uncomfortable appearing pt here with pleuritic chest pain sharp mod in severity worse with deep inspiration improves with rest, pt has a normal PE and neurovascular intact, will r/o PE, acs, pnx, ptx

## 2023-05-12 NOTE — ED ADULT NURSE NOTE - NSFALLUNIVINTERV_ED_ALL_ED
Bed/Stretcher in lowest position, wheels locked, appropriate side rails in place/Call bell, personal items and telephone in reach/Instruct patient to call for assistance before getting out of bed/chair/stretcher/Non-slip footwear applied when patient is off stretcher/[place] to call system/Physically safe environment - no spills, clutter or unnecessary equipment/Purposeful proactive rounding/Room/bathroom lighting operational, light cord in reach

## 2023-05-12 NOTE — ED ADULT NURSE NOTE - OBJECTIVE STATEMENT
pt c/o SOB x12 hours, states she feels like shes wheezing. no distress upon RN assessment, dry cough noted. resp even and unlabored, sleeping in bed.

## 2023-05-12 NOTE — ED PROVIDER NOTE - OBJECTIVE STATEMENT
26 yo f pw acute onset of pleuritic chest pain sharp mod in severity non radiating anterior worse with deep inspiration non exertional ongoing for 1 d in duration. No sob, n/v, diaphoresis, palpitations, hemoptysis. Appears uncomfortable during the interveiw.    I have reviewed available current nursing and previous documentation of past medical, surgical, family, and/or social history.

## 2023-05-15 DIAGNOSIS — Z91.013 ALLERGY TO SEAFOOD: ICD-10-CM

## 2023-05-15 DIAGNOSIS — R09.1 PLEURISY: ICD-10-CM

## 2023-05-15 DIAGNOSIS — R06.02 SHORTNESS OF BREATH: ICD-10-CM

## 2023-05-15 DIAGNOSIS — Z91.040 LATEX ALLERGY STATUS: ICD-10-CM

## 2023-06-16 NOTE — ED ADULT TRIAGE NOTE - PAIN RATING/NUMBER SCALE (0-10): REST
Specialty Pharmacy - Refill Coordination    Specialty Medication Orders Linked to Encounter      Flowsheet Row Most Recent Value   Medication #1 evolocumab (REPATHA SURECLICK) 140 mg/mL PnIj (Order#[id number], Rx#[id number])            Refill Questions - Documented Responses      Flowsheet Row Most Recent Value   Patient Availability and HIPAA Verification    Does patient want to proceed with activity? Yes   HIPAA/medical authority confirmed? Yes   Relationship to patient of person spoken to? Self   Refill Screening Questions    Would patient like to speak to a pharmacist? No   When does the patient need to receive the medication? 06/23/23   Refill Delivery Questions    How will the patient receive the medication? MEDRx   When does the patient need to receive the medication? 06/23/23   Shipping Address Home   Address in [facility] confirmed and updated if neccessary? Yes   Expected Copay ($) 0   Is the patient able to afford the medication copay? Yes   Payment Method zero copay   Days supply of Refill 28   Supplies needed? No supplies needed   Refill activity completed? Yes   Refill activity plan Refill scheduled   Shipment/Pickup Date: 06/20/23            Current Outpatient Medications   Medication Sig    albuterol (PROVENTIL HFA) 90 mcg/actuation inhaler Inhale 2 puffs into the lungs every 6 (six) hours as needed for Wheezing or Shortness of Breath. Rescue    albuterol (PROVENTIL) 2.5 mg /3 mL (0.083 %) nebulizer solution Take 3 mLs (2.5 mg total) by nebulization every 6 (six) hours as needed for Wheezing or Shortness of Breath. Rescue    albuterol (PROVENTIL/VENTOLIN HFA) 90 mcg/actuation inhaler Inhale 2 puffs into the lungs every 4 (four) hours as needed for Wheezing or Shortness of Breath. Rescue    albuterol-ipratropium (DUO-NEB) 2.5 mg-0.5 mg/3 mL nebulizer solution Take 3 mLs by nebulization every 6 (six) hours as needed for Wheezing or Shortness of Breath. Rescue    ammonium lactate 12 % Crea Apply  twice daily to affected parts both feet as needed.    apixaban (ELIQUIS) 5 mg Tab Take 1 tablet (5 mg total) by mouth 2 (two) times daily.    benzonatate (TESSALON) 200 MG capsule Take 1 capsule (200 mg total) by mouth 3 (three) times daily as needed.    blood-glucose sensor (DEXCOM G6 SENSOR) [place] 3 each by Misc.(Non-Drug; Combo Route) route continuous.    blood-glucose transmitter (DEXCOM G6 TRANSMITTER) [place] 1 each by Misc.(Non-Drug; Combo Route) route continuous.    carvediloL (COREG) 6.25 MG tablet Take 1 tablet (6.25 mg total) by mouth 2 (two) times daily.    diclofenac sodium (VOLTAREN) 1 % Gel Apply 2 g topically 4 (four) times daily. (Patient taking differently: Apply 2 g topically as needed.)    esomeprazole (NEXIUM) 40 MG capsule Take 1 capsule (40 mg total) by mouth 2 (two) times daily.    evolocumab (REPATHA SURECLICK) 140 mg/mL PnIj Inject 1 mL (140 mg total) into the skin every 14 (fourteen) days.    ferrous sulfate (FEOSOL) 325 mg (65 mg iron) Tab tablet TAKE 1 TABLET BY MOUTH THREE TIMES DAILY    furosemide (LASIX) 20 MG tablet Take 1 tablet (20 mg total) by mouth once daily.    guaiFENesin (MUCINEX) 600 mg 12 hr tablet Take 2 tablets (1,200 mg total) by mouth 2 (two) times daily.    GVOKE HYPOPEN 2-PACK 1 mg/0.2 mL AtIn INJECT SUBCUTANEOUSLY  CONTENTS OF 1 AUTO-INJECTOR AS NEEDED FOR HYPOGLCEMIA  AS DIRECTED    insulin lispro (HUMALOG U-100 INSULIN) 100 unit/mL injection USE AS DIRECTED VIA V-GO 20 with 3 CLICKS PER MEAL    ketoconazole (NIZORAL) 2 % cream Apply topically once daily.    levoFLOXacin (LEVAQUIN) 500 MG tablet Take 1 tablet (500 mg total) by mouth once daily. for 10 days    LIDOcaine HCL 2% (XYLOCAINE) 2 % jelly Apply topically as needed. Apply topically once nightly to affected part of foot/feet, for pain.    losartan (COZAAR) 25 MG tablet Take 1/2 (one-half) tablet by mouth once daily    mupirocin (BACTROBAN) 2 % ointment Apply to affected area 3 times daily    mupirocin (BACTROBAN)  2 % ointment Apply topically 2 (two) times daily. (Patient not taking: Reported on 6/7/2023)    nitroGLYCERIN (NITROSTAT) 0.4 MG SL tablet DISSOLVE 1 TABLET UNDER THE TONGUE EVERY 5 MINUTES AS  NEEDED FOR CHEST PAIN. MAX  OF 3 TABLETS IN 15 MINUTES. CALL 911 IF PAIN PERSISTS.    ondansetron (ZOFRAN) 4 MG tablet Take 1 tablet (4 mg total) by mouth every 8 (eight) hours as needed for Nausea.    papaverine 30 mg/mL injection Add: Phentolamine 1 mg  Add: PGE1 10 mcg    Sig:  Inject 25 units as directed; titrate up by 5 units until desired results    ranolazine (RANEXA) 1,000 mg Tb12 TAKE 1 TABLET BY MOUTH  TWICE DAILY    rosuvastatin (CRESTOR) 40 MG Tab Take 1 tablet (40 mg total) by mouth once daily.    spironolactone (ALDACTONE) 25 MG tablet Take 1 tablet (25 mg total) by mouth once daily.    sub-q insulin device, 20 unit (VGO 20) [place] 1 Device by Misc.(Non-Drug; Combo Route) route once daily.    tamsulosin (FLOMAX) 0.4 mg Cap Take 1 capsule (0.4 mg total) by mouth once daily.    tirzepatide (MOUNJARO) 5 mg/0.5 mL PnIj Inject 5 mg into the skin every 7 days.    TRULICITY 4.5 mg/0.5 mL pen injector Inject into the skin.   Last reviewed on 6/13/2023  9:32 AM by Belkys Kruger MD    Review of patient's allergies indicates:  No Known Allergies Last reviewed on  6/13/2023 9:32 AM by Belkys Kruger      Tasks added this encounter   No tasks added.   Tasks due within next 3 months   6/16/2023 - Refill Coordination Outreach (1 time occurrence)     Yesica Hernandez [facility]  [address] [place] [id number]  Phone: [phone]  Fax: [phone]         10

## 2023-07-01 ENCOUNTER — EMERGENCY (EMERGENCY)
Facility: HOSPITAL | Age: 28
LOS: 1 days | Discharge: HOME | End: 2023-07-01
Admitting: EMERGENCY MEDICINE
Payer: MEDICAID

## 2023-07-01 VITALS
WEIGHT: 242.51 LBS | HEIGHT: 66 IN | OXYGEN SATURATION: 99 % | HEART RATE: 72 BPM | RESPIRATION RATE: 18 BRPM | TEMPERATURE: 99 F | SYSTOLIC BLOOD PRESSURE: 117 MMHG | DIASTOLIC BLOOD PRESSURE: 73 MMHG

## 2023-07-01 PROCEDURE — 99283 EMERGENCY DEPT VISIT LOW MDM: CPT

## 2023-07-01 RX ORDER — IBUPROFEN 200 MG
1 TABLET ORAL
Qty: 20 | Refills: 0
Start: 2023-07-01 | End: 2023-07-10

## 2023-07-01 RX ORDER — IBUPROFEN 200 MG
600 TABLET ORAL ONCE
Refills: 0 | Status: COMPLETED | OUTPATIENT
Start: 2023-07-01 | End: 2023-07-01

## 2023-07-01 RX ADMIN — Medication 1 TABLET(S): at 12:43

## 2023-07-01 RX ADMIN — Medication 600 MILLIGRAM(S): at 12:43

## 2023-07-01 NOTE — ED ADULT TRIAGE NOTE - CHIEF COMPLAINT QUOTE
patient here with tooth pain on and off for 6months but states it got worse over this past week; did not take any pain meds; next dentist appt is a month from now

## 2023-07-01 NOTE — ED PROVIDER NOTE - PATIENT PORTAL LINK FT
08-May-2021 00:10 You can access the FollowMyHealth Patient Portal offered by [facility] by registering at the following website: [email or web address]. By joining [employer]’s FollowMyHealth portal, you will also be able to view your health information using other applications (apps) compatible with our system.

## 2023-07-01 NOTE — ED ADULT NURSE NOTE - NSFALLUNIVINTERV_ED_ALL_ED
Bed/Stretcher in lowest position, wheels locked, appropriate side rails in place/Call bell, personal items and telephone in reach/Instruct patient to call for assistance before getting out of bed/chair/stretcher/Non-slip footwear applied when patient is off stretcher/[place] to call system/Physically safe environment - no spills, clutter or unnecessary equipment/Purposeful proactive rounding/Room/bathroom lighting operational, light cord in reach

## 2023-07-01 NOTE — ED PROVIDER NOTE - OBJECTIVE STATEMENT
28 y/o F with no PMH presents to the ED for toothache. Pt reports pain to the 2nd tooth of the bottom left region. The pain has been ongoing for 6 months but worsened this week. She denies fevers or chills.

## 2023-07-01 NOTE — ED PROVIDER NOTE - CLINICAL SUMMARY MEDICAL DECISION MAKING FREE TEXT BOX
Pt presenting with dental pain. On exam, Pt has a cracked tooth on tooth #18. Plan for pain control and Antibiotics. Advised follow up with [facility] emergency dental services. Pt presenting with dental pain. On exam, Pt has a cracked tooth on tooth #18. Plan for pain control and Antibiotics. Advised follow up with [facility] emergency dental services or pts own dentist    The patient understands the Emergency Department diagnosis is a preliminary diagnosis often based on limited information and that the patient must adhere to the follow-up plan as discussed.  At the time of discharge from the Emergency Department, the patient is alert with fluent appropriate speech and ambulatory without difficulty.  A medical screening examination was performed and no emergency medical condition was identified.

## 2023-07-01 NOTE — ED PROVIDER NOTE - NSFOLLOWUPINSTRUCTIONS_ED_ALL_ED_FT
Please take augmentin as perscribed    You may take ibuprofen 600 mg every six to eight hours with food for pain. You may also take tylenol 650 mg every six hours for pain, do not exceed 3000 mg daily of tylenol aka acetomenophen. It is safe to take these medications together.    Located in: [facility] [employer]  Address: [address] [place], [place] [id number]  Hours:   Open · Closes 4?PM  Phone: [phone]

## 2023-07-01 NOTE — ED ADULT NURSE NOTE - OBJECTIVE STATEMENT
26 y/o F here with tooth pain on and off for 6months but states it got worse over this past week; did not take any pain meds; next dentist appt is a month from now

## 2023-07-03 DIAGNOSIS — K08.89 OTHER SPECIFIED DISORDERS OF TEETH AND SUPPORTING STRUCTURES: ICD-10-CM

## 2023-07-03 DIAGNOSIS — Z91.040 LATEX ALLERGY STATUS: ICD-10-CM

## 2023-07-03 DIAGNOSIS — Z97.5 PRESENCE OF (INTRAUTERINE) CONTRACEPTIVE DEVICE: ICD-10-CM

## 2023-07-03 DIAGNOSIS — Z91.013 ALLERGY TO SEAFOOD: ICD-10-CM

## 2023-07-05 NOTE — ED PROVIDER NOTE - PSH
Chief Complaints and History of Present Illnesses   Patient presents with     Post Op (Ophthalmology) Right Eye     Chief Complaint(s) and History of Present Illness(es)     Post Op (Ophthalmology) Right Eye            Laterality: right eye    Associated symptoms: burning (when instilling ketorolac).  Negative for eye pain, flashes and floaters    Treatments tried: eye drops          Comments    Here for 1 week post op right eye. The eye is doing well. Some burning sensation when using ketorolac. No flashes or floaters. No eye pain.    Nicholas Aguillon COT 7:47 AM July 5, 2023                    
No significant past surgical history

## 2023-08-11 NOTE — ED ADULT NURSE NOTE - ALCOHOL PRE SCREEN (AUDIT - C)
What Type Of Note Output Would You Prefer (Optional)?: Bullet Format Has Your Skin Lesion Been Treated?: not been treated Is This A New Presentation, Or A Follow-Up?: Skin Lesion Statement Selected

## 2023-09-18 ENCOUNTER — EMERGENCY (EMERGENCY)
Facility: HOSPITAL | Age: 28
LOS: 1 days | Discharge: HOME | End: 2023-09-18
Admitting: EMERGENCY MEDICINE
Payer: MEDICAID

## 2023-09-18 VITALS
OXYGEN SATURATION: 99 % | HEART RATE: 91 BPM | WEIGHT: 259.93 LBS | RESPIRATION RATE: 18 BRPM | DIASTOLIC BLOOD PRESSURE: 82 MMHG | SYSTOLIC BLOOD PRESSURE: 117 MMHG

## 2023-09-18 DIAGNOSIS — Z91.040 LATEX ALLERGY STATUS: ICD-10-CM
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
## 2023-09-18 DIAGNOSIS — Z91.013 ALLERGY TO SEAFOOD: ICD-10-CM

## 2023-09-18 DIAGNOSIS — S09.93XA UNSPECIFIED INJURY OF FACE, INITIAL ENCOUNTER: ICD-10-CM
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
## 2023-09-18 DIAGNOSIS — K13.79 OTHER LESIONS OF ORAL MUCOSA: ICD-10-CM

## 2023-09-18 PROCEDURE — 99284 EMERGENCY DEPT VISIT MOD MDM: CPT

## 2023-09-18 RX ORDER — CHLORHEXIDINE GLUCONATE 213 G/1000ML
15 SOLUTION TOPICAL
Qty: 1 | Refills: 0
Start: 2023-09-18 | End: 2023-09-22

## 2023-09-18 NOTE — ED ADULT NURSE NOTE - NSFALLUNIVINTERV_ED_ALL_ED
Bed/Stretcher in lowest position, wheels locked, appropriate side rails in place/Call bell, personal items and telephone in reach/Instruct patient to call for assistance before getting out of bed/chair/stretcher/Non-slip footwear applied when patient is off stretcher/[place] to call system/Physically safe environment - no spills, clutter or unnecessary equipment/Purposeful proactive rounding/Room/bathroom lighting operational, light cord in reach

## 2023-09-18 NOTE — ED PROVIDER NOTE - OBJECTIVE STATEMENT
27-year-old female, presenting to the emergency room complaining of bump noted in her left inner cheek for the past few days.  Patient states the bump was first noted on Friday but then she subsequently bit over the area resulting in some discomfort and pain.  She reports going to [facility] for evaluation but was told that the dentist would not be available for an appointment until the end of October.  Patient denies fevers, chills, trouble swallowing, shortness of breath, nausea or vomiting.

## 2023-09-18 NOTE — ED PROVIDER NOTE - CLINICAL SUMMARY MEDICAL DECISION MAKING FREE TEXT BOX
27-year-old female, presenting to the emergency room complaining of bump noted in her left inner cheek for the past few days. On exam patient is noted to have small area of torn tissue along the left buccal mucosa lateral to tooth #18.  Based on the position of the tear, it appears that the upper and lower teeth are likely biting into the area causing the injury.  Patient previously has had tooth numbers 1, 16, and 17 removed (wisdom teeth), however tooth numbers 15 and 18 are likely biting into the area causing patient's pain.  Will recommend dental follow-up.  Patient directed to follow-up at the [facility] dental clinic for further evaluation.  There is no acute evidence of infection on exam.  Will discharge with Peridex mouthwash and recommending regular tooth care with brushing.  Motrin and Tylenol can also be used for any additional pain relief.  Patient stable on discharge.

## 2023-09-18 NOTE — ED ADULT NURSE NOTE - OBJECTIVE STATEMENT
Pt is A&OX4. Presented with mouth pain for the past few days. Denies fever. No acute distress observed.

## 2023-09-18 NOTE — ED ADULT NURSE NOTE - SUICIDE SCREENING QUESTION 2
[No Acute Distress] : no acute distress [Normal Oropharynx] : normal oropharynx [Normal Appearance] : normal appearance [No Neck Mass] : no neck mass [Normal Rate/Rhythm] : normal rate/rhythm No [Murmur ___ / 6] : murmur [unfilled] / 6 [Normal S1, S2] : normal s1, s2 [No Resp Distress] : no resp distress [Clear to Auscultation Bilaterally] : clear to auscultation bilaterally [No Abnormalities] : no abnormalities [Benign] : benign [Normal Gait] : normal gait [No Clubbing] : no clubbing [No Cyanosis] : no cyanosis [No Edema] : no edema [FROM] : FROM [Normal Color/ Pigmentation] : normal color/ pigmentation [No Focal Deficits] : no focal deficits [Oriented x3] : oriented x3 [Normal Affect] : normal affect [TextBox_68] : Improved breath sounds LLL but somewhat diminished.

## 2023-09-18 NOTE — ED PROVIDER NOTE - NS ED ROS FT
+oral pain  Denies fevers, chills, nausea, vomiting, diarrhea, constipation, abdominal pain, urinary symptoms, chest pain, palpitations, shortness of breath, dyspnea on exertion, syncope/near syncope, cough/URI symptoms, headache, weakness, numbness, focal deficits, visual changes, gait or balance changes, dizziness

## 2023-09-18 NOTE — ED PROVIDER NOTE - NSFOLLOWUPINSTRUCTIONS_ED_ALL_ED_FT
[facility]  [address] · [phone]   PLEASE FOLLOW UP WITH THE DENTAL CLINIC.    Dental Pain    Dental pain (toothache) may be caused by many things including tooth decay (cavities or caries), abscess or infection, or trauma. If you were prescribed an antibiotic medicine, finish all of it even if you start to feel better. Rinsing your mouth with salt water or applying ice to the painful area of your face may help with the pain. Follow up with a dentist is important in ensuring good oral health and preventing the worsening of dental disease.    SEEK IMMEDIATE MEDICAL CARE IF YOU HAVE ANY OF THE FOLLOWING SYMPTOMS: unable to open your mouth, trouble breathing or swallowing, fever, or swelling of the face, neck, or jaw.

## 2023-09-18 NOTE — ED PROVIDER NOTE - PATIENT PORTAL LINK FT
You can access the FollowMyHealth Patient Portal offered by [facility] by registering at the following website: [email or web address]. By joining [employer]’s FollowMyHealth portal, you will also be able to view your health information using other applications (apps) compatible with our system.

## 2023-09-18 NOTE — ED PROVIDER NOTE - PHYSICAL EXAMINATION
VITAL SIGNS: I have reviewed nursing notes and confirm.  CONSTITUTIONAL: Well-developed; well-nourished; in no acute distress.  SKIN: No acute rash.  HEAD: Normocephalic; atraumatic.  ENT: oropharynx clear; small tear noted along the L buccal mucosa, lateral to tooth #18. No bleeding or erythema. +ttp in area.  CARD: No extremity cyanosis.  RESP: Speaks in full, clear sentences.  EXT: Moves all extremities normally.  NEURO: Alert, oriented. Grossly unremarkable. No focal deficits. Fluent speech.   PSYCH: Cooperative, appropriate. Mood and affect wnl.

## 2024-01-03 NOTE — ED ADULT NURSE NOTE - CHPI ED NUR SYMPTOMS NEG
no anorexia/no bladder dysfunction/no bowel dysfunction/no constipation/no difficulty bearing weight/no fatigue/no motor function loss/no neck tenderness/no numbness/no tingling
2 = A lot of assistance

## 2024-01-12 NOTE — ED ADULT NURSE NOTE - NS ED NURSE DC INFO COMPLEXITY
Transferred patient to Spine and Pain office.   Simple: Patient demonstrates quick and easy understanding

## 2024-01-23 NOTE — ED ADULT NURSE NOTE - CAS DISCH CONDITION
01/23/24 1428   Post-Acute Status   Post-Acute Authorization Placement   Post-Acute Placement Status Referrals Sent   Discharge Plan   Discharge Plan A Rehab   Discharge Plan B Rehab       After speaking to patient's mother, she has agreed to discharge plan being home w/OP Rehab services from [facility]. CM did reach out to [facility] and confirmed w/Laxmi at [phone] that they are in network w/patient's insurance. Referral submitted for review.     Stable

## 2024-02-21 NOTE — ED PROVIDER NOTE - LOCATION
LTM/EMU   Video: Yes  Photic: No  HV: No  Impedances: 6-10  Leads Fixed: Yes  Events seen: No  Patient ratio: 5/5   suprapubic region

## 2024-03-15 ENCOUNTER — EMERGENCY (EMERGENCY)
Facility: HOSPITAL | Age: 29
LOS: 1 days | Discharge: HOME | End: 2024-03-15
Admitting: EMERGENCY MEDICINE
Payer: MEDICAID

## 2024-03-15 VITALS
DIASTOLIC BLOOD PRESSURE: 76 MMHG | WEIGHT: 293 LBS | RESPIRATION RATE: 16 BRPM | TEMPERATURE: 98 F | HEART RATE: 75 BPM | OXYGEN SATURATION: 98 % | HEIGHT: 66 IN | SYSTOLIC BLOOD PRESSURE: 120 MMHG

## 2024-03-15 VITALS
SYSTOLIC BLOOD PRESSURE: 105 MMHG | DIASTOLIC BLOOD PRESSURE: 62 MMHG | OXYGEN SATURATION: 97 % | HEART RATE: 60 BPM | TEMPERATURE: 98 F | RESPIRATION RATE: 16 BRPM

## 2024-03-15 DIAGNOSIS — R07.89 OTHER CHEST PAIN: ICD-10-CM

## 2024-03-15 DIAGNOSIS — Z91.040 LATEX ALLERGY STATUS: ICD-10-CM

## 2024-03-15 DIAGNOSIS — Z91.013 ALLERGY TO SEAFOOD: ICD-10-CM

## 2024-03-15 LAB
ALBUMIN SERPL ELPH-MCNC: 3 G/DL — LOW (ref 3.4–5)
ALP SERPL-CCNC: 74 U/L — SIGNIFICANT CHANGE UP (ref 40–120)
ALT FLD-CCNC: 11 U/L — LOW (ref 12–42)
ANION GAP SERPL CALC-SCNC: 10 MMOL/L — SIGNIFICANT CHANGE UP (ref 9–16)
APPEARANCE UR: ABNORMAL
AST SERPL-CCNC: 11 U/L — LOW (ref 15–37)
BACTERIA # UR AUTO: ABNORMAL /HPF
BASOPHILS # BLD AUTO: 0.02 K/UL — SIGNIFICANT CHANGE UP (ref 0–0.2)
BASOPHILS NFR BLD AUTO: 0.4 % — SIGNIFICANT CHANGE UP (ref 0–2)
BILIRUB SERPL-MCNC: 0.3 MG/DL — SIGNIFICANT CHANGE UP (ref 0.2–1.2)
BILIRUB UR-MCNC: NEGATIVE — SIGNIFICANT CHANGE UP
BUN SERPL-MCNC: 11 MG/DL — SIGNIFICANT CHANGE UP (ref 7–23)
CALCIUM SERPL-MCNC: 9 MG/DL — SIGNIFICANT CHANGE UP (ref 8.5–10.5)
CHLORIDE SERPL-SCNC: 107 MMOL/L — SIGNIFICANT CHANGE UP (ref 96–108)
CO2 SERPL-SCNC: 24 MMOL/L — SIGNIFICANT CHANGE UP (ref 22–31)
COLOR SPEC: YELLOW — SIGNIFICANT CHANGE UP
COMMENT - URINE: SIGNIFICANT CHANGE UP
CREAT SERPL-MCNC: 0.83 MG/DL — SIGNIFICANT CHANGE UP (ref 0.5–1.3)
D DIMER BLD IA.RAPID-MCNC: <187 NG/ML DDU — SIGNIFICANT CHANGE UP
DIFF PNL FLD: NEGATIVE — SIGNIFICANT CHANGE UP
EGFR: 99 ML/MIN/1.73M2 — SIGNIFICANT CHANGE UP
EOSINOPHIL # BLD AUTO: 0.08 K/UL — SIGNIFICANT CHANGE UP (ref 0–0.5)
EOSINOPHIL NFR BLD AUTO: 1.5 % — SIGNIFICANT CHANGE UP (ref 0–6)
EPI CELLS # UR: 10 — SIGNIFICANT CHANGE UP
GLUCOSE SERPL-MCNC: 99 MG/DL — SIGNIFICANT CHANGE UP (ref 70–99)
GLUCOSE UR QL: NEGATIVE MG/DL — SIGNIFICANT CHANGE UP
HCG SERPL-ACNC: <1 MIU/ML — SIGNIFICANT CHANGE UP
HCT VFR BLD CALC: 38.7 % — SIGNIFICANT CHANGE UP (ref 34.5–45)
HGB BLD-MCNC: 11.7 G/DL — SIGNIFICANT CHANGE UP (ref 11.5–15.5)
IMM GRANULOCYTES NFR BLD AUTO: 0.2 % — SIGNIFICANT CHANGE UP (ref 0–0.9)
KETONES UR-MCNC: ABNORMAL MG/DL
LEUKOCYTE ESTERASE UR-ACNC: ABNORMAL
LYMPHOCYTES # BLD AUTO: 1.91 K/UL — SIGNIFICANT CHANGE UP (ref 1–3.3)
LYMPHOCYTES # BLD AUTO: 36.4 % — SIGNIFICANT CHANGE UP (ref 13–44)
MCHC RBC-ENTMCNC: 25.2 PG — LOW (ref 27–34)
MCHC RBC-ENTMCNC: 30.2 GM/DL — LOW (ref 32–36)
MCV RBC AUTO: 83.2 FL — SIGNIFICANT CHANGE UP (ref 80–100)
MONOCYTES # BLD AUTO: 0.34 K/UL — SIGNIFICANT CHANGE UP (ref 0–0.9)
MONOCYTES NFR BLD AUTO: 6.5 % — SIGNIFICANT CHANGE UP (ref 2–14)
NEUTROPHILS # BLD AUTO: 2.89 K/UL — SIGNIFICANT CHANGE UP (ref 1.8–7.4)
NEUTROPHILS NFR BLD AUTO: 55 % — SIGNIFICANT CHANGE UP (ref 43–77)
NITRITE UR-MCNC: POSITIVE
NRBC # BLD: 0 /100 WBCS — SIGNIFICANT CHANGE UP (ref 0–0)
NT-PROBNP SERPL-SCNC: 11 PG/ML — SIGNIFICANT CHANGE UP
PH UR: 6.5 — SIGNIFICANT CHANGE UP (ref 5–8)
PLATELET # BLD AUTO: 312 K/UL — SIGNIFICANT CHANGE UP (ref 150–400)
POTASSIUM SERPL-MCNC: 4.5 MMOL/L — SIGNIFICANT CHANGE UP (ref 3.5–5.3)
POTASSIUM SERPL-SCNC: 4.5 MMOL/L — SIGNIFICANT CHANGE UP (ref 3.5–5.3)
PROT SERPL-MCNC: 7.4 G/DL — SIGNIFICANT CHANGE UP (ref 6.4–8.2)
PROT UR-MCNC: NEGATIVE MG/DL — SIGNIFICANT CHANGE UP
RBC # BLD: 4.65 M/UL — SIGNIFICANT CHANGE UP (ref 3.8–5.2)
RBC # FLD: 14.6 % — HIGH (ref 10.3–14.5)
RBC CASTS # UR COMP ASSIST: 0 /HPF — SIGNIFICANT CHANGE UP (ref 0–4)
SODIUM SERPL-SCNC: 141 MMOL/L — SIGNIFICANT CHANGE UP (ref 132–145)
SP GR SPEC: 1.03 — SIGNIFICANT CHANGE UP (ref 1–1.03)
TROPONIN I, HIGH SENSITIVITY RESULT: <4 NG/L — SIGNIFICANT CHANGE UP
TROPONIN I, HIGH SENSITIVITY RESULT: <4 NG/L — SIGNIFICANT CHANGE UP
UROBILINOGEN FLD QL: 1 MG/DL — SIGNIFICANT CHANGE UP (ref 0.2–1)
WBC # BLD: 5.25 K/UL — SIGNIFICANT CHANGE UP (ref 3.8–10.5)
WBC # FLD AUTO: 5.25 K/UL — SIGNIFICANT CHANGE UP (ref 3.8–10.5)
WBC UR QL: 25 /HPF — HIGH (ref 0–5)

## 2024-03-15 PROCEDURE — 71046 X-RAY EXAM CHEST 2 VIEWS: CPT | Mod: 26

## 2024-03-15 PROCEDURE — 99285 EMERGENCY DEPT VISIT HI MDM: CPT

## 2024-03-15 RX ORDER — KETOROLAC TROMETHAMINE 30 MG/ML
15 SYRINGE (ML) INJECTION ONCE
Refills: 0 | Status: DISCONTINUED | OUTPATIENT
Start: 2024-03-15 | End: 2024-03-15

## 2024-03-15 RX ORDER — IPRATROPIUM/ALBUTEROL SULFATE 18-103MCG
3 AEROSOL WITH ADAPTER (GRAM) INHALATION ONCE
Refills: 0 | Status: COMPLETED | OUTPATIENT
Start: 2024-03-15 | End: 2024-03-15

## 2024-03-15 RX ADMIN — Medication 3 MILLILITER(S): at 13:05

## 2024-03-15 RX ADMIN — Medication 15 MILLIGRAM(S): at 13:04

## 2024-03-15 NOTE — ED PROVIDER NOTE - NSICDXPASTMEDICALHX_GEN_ALL_CORE_FT
PAST MEDICAL HISTORY:  Anemia     IUD (intrauterine device) in place copper    Obesity     Trichomoniasis     UTI (urinary tract infection)

## 2024-03-15 NOTE — ED PROVIDER NOTE - NSFOLLOWUPINSTRUCTIONS_ED_ALL_ED_FT
Chest Pain    Chest pain can be caused by many different conditions which may or may not be dangerous. Causes include heartburn, lung infections, heart attack, blood clot in lungs, skin infections, strain or damage to muscle, cartilage, or bones, etc. In addition to a history and physical examination, an electrocardiogram (ECG) or other lab tests may have been performed to determine the cause of your chest pain. Follow up with your primary care provider or with a cardiologist as instructed.     SEEK IMMEDIATE MEDICAL CARE IF YOU HAVE ANY OF THE FOLLOWING SYMPTOMS: worsening chest pain, coughing up blood, unexplained back/neck/jaw pain, severe abdominal pain, dizziness or lightheadedness, fainting, shortness of breath, sweaty or clammy skin, vomiting, or racing heart beat. These symptoms may represent a serious problem that is an emergency. Do not wait to see if the symptoms will go away. Get medical help right away. Call 911 and do not drive yourself to the hospital.     PLEASE FOLLOW UP WITH DR. LOPEZ OR OTHER CARDIOLOGIST WITHIN 1 WEEK. YOU HAVE MANY RISK FACTORS FOR HAVING A HEART ATTACK SO IT IS IMPORTANT THAT YOU SEE A CARDIOLOGIST.

## 2024-03-15 NOTE — ED PROVIDER NOTE - CLINICAL SUMMARY MEDICAL DECISION MAKING FREE TEXT BOX
pt presents with c/o chest pain, intermittent, but constant since this morning. will eval for acs vs arrythymia vs infection. low suspicion for PE, d dimer negative. trop negative x 2. cxr clear. ekg nonacute. will d/c.

## 2024-03-15 NOTE — ED PROVIDER NOTE - CARE PROVIDER_API CALL
Rosas Mejias  Cardiovascular Disease  [address] [place] [address], [address]  [place] [address]  Phone: [phone]  Fax: [phone]  Follow Up Time:

## 2024-03-15 NOTE — ED PROVIDER NOTE - PATIENT PORTAL LINK FT
You can access the FollowMyHealth Patient Portal offered by [facility] by registering at the following website: [email or web address]. By joining [employer]’s FollowMyHealth portal, you will also be able to view your health information using other applications (apps) compatible with our system.

## 2024-03-15 NOTE — ED ADULT TRIAGE NOTE - CHIEF COMPLAINT QUOTE
Pt with chest pain and SOB since 3/13 that has been intermittent. Pt denies any cough/cold sx, denies cardiac and resp hx.

## 2024-03-15 NOTE — ED ADULT NURSE NOTE - NSFALLUNIVINTERV_ED_ALL_ED
Bed/Stretcher in lowest position, wheels locked, appropriate side rails in place/Call bell, personal items and telephone in reach/Instruct patient to call for assistance before getting out of bed/chair/stretcher/Non-slip footwear applied when patient is off stretcher/[place] to call system/Physically safe environment - no spills, clutter or unnecessary equipment/Purposeful proactive rounding/Room/bathroom lighting operational, light cord in reach

## 2024-03-15 NOTE — ED ADULT NURSE NOTE - OBJECTIVE STATEMENT
Patient presents with complaints of intermittent sharp, non-radiating midsternal chest pain associated with shortness of breath for "years" but today it was severe 9/10. Patient has not been seeing a cardiologist and no PCP. No known PMH. Also c/o headache. Stated that on Monday she vomited but no longer. Denies fever, chills, cough, sore throat, dizziness.

## 2024-03-15 NOTE — ED PROVIDER NOTE - OBJECTIVE STATEMENT
27yo otherwise healthy F presents with c/o sharp central chest pain, nonpleuritic, nonradiating, nonexertional, intermittent for years but constant since this morning with associated SOB. + left ankle swelling intermittent for months. no hormonal birth control.  no recent travel or immobilization. no cigarettes. denies fever, chills, cough, wheezing, abd pain, numbness, tingling, weakness, dizziness, palpitations, headache, syncope. pt has needed albuterol inhaler in the past but does not have one currently. pt states some correlation between symptom frequency and weight gain.

## 2024-04-11 NOTE — ED PROVIDER NOTE - NS_EDPROVIDERDISPOUSERTYPE_ED_A_ED
Detail Level: Zone Hide Additional Notes?: No I have personally evaluated and examined the patient. The Attending was available to me as a supervising provider if needed. Never

## 2024-05-22 ENCOUNTER — EMERGENCY (EMERGENCY)
Facility: HOSPITAL | Age: 29
LOS: 1 days | Discharge: HOME | End: 2024-05-22
Admitting: EMERGENCY MEDICINE
Payer: MEDICAID

## 2024-05-22 VITALS
OXYGEN SATURATION: 96 % | RESPIRATION RATE: 18 BRPM | DIASTOLIC BLOOD PRESSURE: 76 MMHG | HEART RATE: 87 BPM | TEMPERATURE: 99 F | SYSTOLIC BLOOD PRESSURE: 110 MMHG

## 2024-05-22 DIAGNOSIS — Z91.013 ALLERGY TO SEAFOOD: ICD-10-CM
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
## 2024-05-22 DIAGNOSIS — S00.81XA ABRASION OF OTHER PART OF HEAD, INITIAL ENCOUNTER: ICD-10-CM
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
## 2024-05-22 DIAGNOSIS — Z23 ENCOUNTER FOR IMMUNIZATION: ICD-10-CM

## 2024-05-22 DIAGNOSIS — Z91.040 LATEX ALLERGY STATUS: ICD-10-CM

## 2024-05-22 DIAGNOSIS — R51.9 HEADACHE, UNSPECIFIED: ICD-10-CM

## 2024-05-22 PROBLEM — Z97.5 PRESENCE OF (INTRAUTERINE) CONTRACEPTIVE DEVICE: Chronic | Status: ACTIVE | Noted: 2018-08-10

## 2024-05-22 PROCEDURE — 99284 EMERGENCY DEPT VISIT MOD MDM: CPT | Mod: 25

## 2024-05-22 PROCEDURE — 70450 CT HEAD/BRAIN W/O DYE: CPT | Mod: 26,MC

## 2024-05-22 RX ORDER — BACITRACIN ZINC 500 UNIT/G
1 OINTMENT IN PACKET (EA) TOPICAL ONCE
Refills: 0 | Status: COMPLETED | OUTPATIENT
Start: 2024-05-22 | End: 2024-05-22

## 2024-05-22 RX ORDER — TETANUS TOXOID, REDUCED DIPHTHERIA TOXOID AND ACELLULAR PERTUSSIS VACCINE, ADSORBED 5; 2.5; 8; 8; 2.5 [IU]/.5ML; [IU]/.5ML; UG/.5ML; UG/.5ML; UG/.5ML
0.5 SUSPENSION INTRAMUSCULAR ONCE
Refills: 0 | Status: COMPLETED | OUTPATIENT
Start: 2024-05-22 | End: 2024-05-22

## 2024-05-22 RX ORDER — ACETAMINOPHEN 500 MG
650 TABLET ORAL ONCE
Refills: 0 | Status: COMPLETED | OUTPATIENT
Start: 2024-05-22 | End: 2024-05-22

## 2024-05-22 RX ADMIN — Medication 650 MILLIGRAM(S): at 04:56

## 2024-05-22 RX ADMIN — TETANUS TOXOID, REDUCED DIPHTHERIA TOXOID AND ACELLULAR PERTUSSIS VACCINE, ADSORBED 0.5 MILLILITER(S): 5; 2.5; 8; 8; 2.5 SUSPENSION INTRAMUSCULAR at 04:56

## 2024-05-22 RX ADMIN — Medication 1 APPLICATION(S): at 04:56
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
## 2024-05-22 NOTE — ED PROVIDER NOTE - PHYSICAL EXAMINATION
CONSTITUTIONAL: Well-appearing; well-nourished; in no apparent distress.   	HEAD: Normocephalic; superficial abrasions to forehead.    	EYES:  conjunctiva and sclera clear  	ENT: normal nose; no rhinorrhea; normal pharynx with no erythema or lesions.   	NECK: Supple; non-tender;   	CARDIOVASCULAR: Normal S1, S2; no murmurs, rubs, or gallops. Regular rate and rhythm.   	RESPIRATORY: Breathing easily; breath sounds clear and equal bilaterally; no wheezes, rhonchi, or rales.  	GI: Soft; non-distended; non-tender  	EXT: JAMIL x 4. normal gait.   	SKIN: superficial abrasions to forehead.   	NEURO: Patient is alert, oriented x person, place and time.  Cranial nerves 2-12 are intact.  Normal gait and speech.  Cerebellar testing normal:  negative Romberg, normal coordination and normal finger to nose, heal to shin and rapid alternating movements.  Normal sensory exam throughout.  No pronator drift.  5/5 bl upper extremity and lower extremity strength. Visual fields intact   PSYCHOLOGICAL: The patient’s mood and manner are appropriate.

## 2024-05-22 NOTE — ED PROVIDER NOTE - PATIENT PORTAL LINK FT
You can access the FollowMyHealth Patient Portal offered by [facility] by registering at the following website: [email or web address]. By joining [employer]’s FollowMyHealth portal, you will also be able to view your health information using other applications (apps) compatible with our system.
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
## 2024-05-22 NOTE — ED PROVIDER NOTE - NSFOLLOWUPINSTRUCTIONS_ED_ALL_ED_FT
Take Ibuprofen 600mg every 6-8 hours as needed for pain, take with food, and in addition you may take Tylenol 500 mg every 6-8 hours as needed for pain  Keep wounds clean and dry    A head injury is most often caused by a blow to the head. This may occur from a fall, bicycle injury, sports injury, being struck in the head, or a motor vehicle accident.    DISCHARGE INSTRUCTIONS:    Call 911 or have someone else call for any of the following:    You cannot be woken.    You have a seizure.    You stop responding to others or you faint.    You have blurry or double vision.    Your speech becomes slurred or confused.    You have arm or leg weakness, loss of feeling, or new problems with coordination.    Your pupils are larger than usual or one pupil is a different size than the other.     You have blood or clear fluid coming out of your ears or nose.  Return to the emergency department if:    You have repeated or forceful vomiting.    You feel confused.    Your headache gets worse or becomes severe.    You or someone caring for you notices that you are harder to wake than usual.  Contact your healthcare provider if:    Your symptoms last longer than 6 weeks after the injury.    You have questions or concerns about your condition or care.  Medicines:    Acetaminophen decreases pain. Acetaminophen is available without a doctor's order. Ask how much to take and how often to take it. Follow directions. Acetaminophen can cause liver damage if not taken correctly.    Take your medicine as directed. Contact your healthcare provider if you think your medicine is not helping or if you have side effects. Tell him or her if you are allergic to any medicine. Keep a list of the medicines, vitamins, and herbs you take. Include the amounts, and when and why you take them. Bring the list or the pill bottles to follow-up visits. Carry your medicine list with you in case of an emergency.  Self-care:    Rest or do quiet activities for 24 to 48 hours. Limit your time watching TV, using the computer, or doing tasks that require a lot of thinking. Slowly return to your normal activities as directed. Do not play sports or do activities that may cause you to get hit in the head. Ask your healthcare provider when you can return to sports.     Apply ice on your head for 15 to 20 minutes every hour or as directed. Use an ice pack, or put crushed ice in a plastic bag. Cover it with a towel before you apply it to your skin. Ice helps prevent tissue damage and decreases swelling and pain.     Have someone stay with you for 24 hours or as directed. This person can monitor you for complications and call 911. When you are awake the person should ask you a few questions to see if you are thinking clearly. An example would be to ask your name or your address.  Prevent another head injury:    Wear a helmet that fits properly. Do this when you play sports, or ride a bike, scooter, or skateboard. Helmets help decrease your risk of a serious head injury. Talk to your healthcare provider about other ways you can protect yourself if you play sports.    Wear your seat belt every time you are in a car. This helps to decrease your risk for a head injury if you are in a car accident.  Follow up with your healthcare provider as directed: Write down your questions so you remember to ask them during your visits.

## 2024-11-04 NOTE — ED ADULT NURSE NOTE - NSSEPSISSUSPECTED_ED_A_ED
Patient Education   Preparing for Your Surgery  For Adults  Getting started  In most cases, a nurse will call to review your health history and instructions. They will give you an arrival time based on your scheduled surgery time. Please be ready to share:  Your doctor's clinic name and phone number  Your medical, surgical, and anesthesia history  A list of allergies and sensitivities  A list of medicines, including herbal treatments and over-the-counter drugs  Whether the patient has a legal guardian (ask how to send us the papers in advance)  Note: You may not receive a call if you were seen at our PAC (Preoperative Assessment Center).  Please tell us if you're pregnant--or if there's any chance you might be pregnant. Some surgeries may injure a fetus (unborn baby), so they require a pregnancy test. Surgeries that are safe for a fetus don't always need a test, and you can choose whether to have one.   Preparing for surgery  Within 10 to 30 days of surgery: Have a pre-op exam (sometimes called an H&P, or History and Physical). This can be done at a clinic or pre-operative center.  If you're having a , you may not need this exam. Talk to your care team.  At your pre-op exam, talk to your care team about all medicines you take. (This includes CBD oil and any drugs, such as THC, marijuana, and other forms of cannabis.) If you need to stop any medicine before surgery, ask when to start taking it again.  This is for your safety. Many medicines and drugs can make you bleed too much during surgery. Some change how well surgery (anesthesia) drugs work.  Call your insurance company to let them know you're having surgery. (If you don't have insurance, call [phone].)  Call your clinic if there's any change in your health. This includes a scrape or scratch near the surgery site, or any signs of a cold (sore throat, runny nose, cough, rash, fever).  Eating and drinking guidelines  For your safety: Unless your  surgeon tells you otherwise, follow the guidelines below.  Eat and drink as normal until 8 hours before you arrive for surgery. After that, no food or milk. You can spit out gum when you arrive.  Drink clear liquids until 2 hours before you arrive. These are liquids you can see through, like water, Gatorade, and Propel Water. They also include plain black coffee and tea (no cream or milk).  No alcohol for 24 hours before you arrive. The night before surgery, stop any drinks that contain THC.  If your care team tells you to take medicine on the morning of surgery, it's okay to take it with a sip of water. No other medicines or drugs are allowed (including CBD oil)--follow your care team's instructions.  If you have questions the day of surgery, call your hospital or surgery center.   Preventing infection  Shower or bathe the night before and the morning of surgery. Follow the instructions your clinic gave you. (If no instructions, use regular soap.)  Don't shave or clip hair near your surgery site. We'll remove the hair if needed.  Don't smoke or vape the morning of surgery. No chewing tobacco for 6 hours before you arrive. A nicotine patch is okay. You may spit out nicotine gum when you arrive.  For some surgeries, the surgeon will tell you to fully quit smoking and nicotine.  We will make every effort to keep you safe from infection. We will:  Clean our hands often with soap and water (or an alcohol-based hand rub).  Clean the skin at your surgery site with a special soap that kills germs.  Give you a special gown to keep you warm. (Cold raises the risk of infection.)  Wear hair covers, masks, gowns, and gloves during surgery.  Give antibiotic medicine, if prescribed. Not all surgeries need this medicine.  What to bring on the day of surgery  Photo ID and insurance card  Copy of your health care directive, if you have one  Glasses and hearing aids (bring cases)  You can't wear contacts during surgery  Inhaler and  eye drops, if you use them (tell us about these when you arrive)  CPAP machine or breathing device, if you use them  A few personal items, if spending the night  If you have . . .  A pacemaker, ICD (cardiac defibrillator), or other implant: Bring the ID card.  An implanted stimulator: Bring the remote control.  A legal guardian: Bring a copy of the certified (court-stamped) guardianship papers.  Please remove any jewelry, including body piercings. Leave jewelry and other valuables at home.  If you're going home the day of surgery  You must have a responsible adult drive you home. They should stay with you overnight as well.  If you don't have someone to stay with you, and you aren't safe to go home alone, we may keep you overnight. Insurance often won't pay for this.  After surgery  If it's hard to control your pain or you need more pain medicine, please call your surgeon's office.  Questions?   If you have any questions for your care team, list them here:   ____________________________________________________________________________________________________________________________________________________________________________________________________________________________________________________________  For informational purposes only. Not to replace the advice of your health care provider. Copyright   2003, 2019 [employer]. All rights reserved. Clinically reviewed by Keith Brooks MD. [employer] [id number] - REV 08/24.      No

## 2024-12-27 NOTE — ED ADULT NURSE NOTE - BREATH SOUNDS, MLM
eICU® Admission Review Note    Reason for ICU Admission:   Admitted with shortness of breath    Unplanned Post-Op ICU Admission: NO    Code Status: Full Resuscitation      Data Reviewed:   Recent Notes:YES   Laboratory Results:YES   Radiology Images/Reports:YES                AV Assessment:YES    EBBP Review:       SUP:   H2 RA   VTEP: Lovenox SQ     Blood Glucose Monitoring:    History DM: Yes     Blood Glucose:  Glucose (mg/dL)   Date Value   12/26/2024 168 (H)      Treatment Ordered:     Ventilator Review:   Intubated: NO    Communication with:RN    Active Problems:     Acute hypercapnic respiratory failure  Pulmonary edema/CHF  Rule out infection. Borderline lactate  Tolerating BiPAP. IPAP 10 EPAP 5 FiO2 30 Follow ABGs  Target SPO2 90-92  High troponin. Consult cardiology. On aspirin and clopidogrel  Transthoracic Echo  Target blood glucose 140-180  DVT prophylaxis     Electronically Signed by:  Conchis Naranjo MD  eICU Physician  12/27/2024 3:49 AM       
Clear

## 2025-04-16 NOTE — ED ADULT NURSE NOTE - TEMPLATE LIST FOR HEAD TO TOE ASSESSMENT
SURVEY:    Thank you for allowing us to care for you today.    You may be receiving a survey from [facility] regarding your visit today- electronically or via mail.      Please help us by completing the survey as this will provide the needed feedback to ensure we are providing the very best care for you and your family.    If you cannot score us a very good on any question, please call the office to discuss how we could have made your experience a very good one.    Thank you.       STAFF:    Katie Omer, Cristina SNOW      CLINICAL STAFF:    Jessenia ROSALES, Ophelia SNOW, Svitlana SNOW, Mary ROSALES   
VIEW ALL
